# Patient Record
Sex: FEMALE | Race: WHITE | Employment: UNEMPLOYED | URBAN - METROPOLITAN AREA
[De-identification: names, ages, dates, MRNs, and addresses within clinical notes are randomized per-mention and may not be internally consistent; named-entity substitution may affect disease eponyms.]

---

## 2020-03-09 ENCOUNTER — OFFICE VISIT (OUTPATIENT)
Dept: PRIMARY CARE CLINIC | Age: 25
End: 2020-03-09
Payer: COMMERCIAL

## 2020-03-09 ENCOUNTER — HOSPITAL ENCOUNTER (OUTPATIENT)
Age: 25
Discharge: HOME | End: 2020-03-11
Payer: COMMERCIAL

## 2020-03-09 VITALS
OXYGEN SATURATION: 98 % | DIASTOLIC BLOOD PRESSURE: 80 MMHG | HEART RATE: 112 BPM | TEMPERATURE: 97.2 F | WEIGHT: 247 LBS | BODY MASS INDEX: 48.24 KG/M2 | SYSTOLIC BLOOD PRESSURE: 128 MMHG

## 2020-03-09 LAB
C-REACTIVE PROTEIN: 0.4 MG/DL (ref 0–0.4)
RHEUMATOID FACTOR: <10 IU/ML (ref 0–13)
SEDIMENTATION RATE, ERYTHROCYTE: 18 MM/HR (ref 0–20)

## 2020-03-09 PROCEDURE — 86140 C-REACTIVE PROTEIN: CPT

## 2020-03-09 PROCEDURE — 1036F TOBACCO NON-USER: CPT | Performed by: FAMILY MEDICINE

## 2020-03-09 PROCEDURE — 86431 RHEUMATOID FACTOR QUANT: CPT

## 2020-03-09 PROCEDURE — 99214 OFFICE O/P EST MOD 30 MIN: CPT | Performed by: FAMILY MEDICINE

## 2020-03-09 PROCEDURE — G8427 DOCREV CUR MEDS BY ELIG CLIN: HCPCS | Performed by: FAMILY MEDICINE

## 2020-03-09 PROCEDURE — 86038 ANTINUCLEAR ANTIBODIES: CPT

## 2020-03-09 PROCEDURE — 85651 RBC SED RATE NONAUTOMATED: CPT

## 2020-03-09 PROCEDURE — G8417 CALC BMI ABV UP PARAM F/U: HCPCS | Performed by: FAMILY MEDICINE

## 2020-03-09 PROCEDURE — 86039 ANTINUCLEAR ANTIBODIES (ANA): CPT

## 2020-03-09 PROCEDURE — G8484 FLU IMMUNIZE NO ADMIN: HCPCS | Performed by: FAMILY MEDICINE

## 2020-03-09 RX ORDER — METHYLPREDNISOLONE 4 MG/1
TABLET ORAL
COMMUNITY
Start: 2020-02-28 | End: 2021-08-05 | Stop reason: ALTCHOICE

## 2020-03-09 RX ORDER — TOPIRAMATE 25 MG/1
TABLET ORAL
Qty: 70 TABLET | Refills: 0 | Status: SHIPPED
Start: 2020-03-09 | End: 2020-03-09

## 2020-03-09 ASSESSMENT — ENCOUNTER SYMPTOMS
VOMITING: 0
SHORTNESS OF BREATH: 0
ABDOMINAL PAIN: 0
RHINORRHEA: 0
WHEEZING: 0
PHOTOPHOBIA: 1
CHEST TIGHTNESS: 0
DIARRHEA: 1
BLURRED VISION: 0
CONSTIPATION: 0
NAUSEA: 0

## 2020-03-09 ASSESSMENT — PATIENT HEALTH QUESTIONNAIRE - PHQ9
SUM OF ALL RESPONSES TO PHQ9 QUESTIONS 1 & 2: 0
1. LITTLE INTEREST OR PLEASURE IN DOING THINGS: 0
SUM OF ALL RESPONSES TO PHQ QUESTIONS 1-9: 0
SUM OF ALL RESPONSES TO PHQ QUESTIONS 1-9: 0
2. FEELING DOWN, DEPRESSED OR HOPELESS: 0

## 2020-03-10 LAB
ANA PATTERN: ABNORMAL
ANA TITER: ABNORMAL
ANTI-NUCLEAR ANTIBODY (ANA): POSITIVE

## 2020-03-11 ENCOUNTER — TELEPHONE (OUTPATIENT)
Dept: PRIMARY CARE CLINIC | Age: 25
End: 2020-03-11

## 2020-05-18 ENCOUNTER — VIRTUAL VISIT (OUTPATIENT)
Dept: PRIMARY CARE CLINIC | Age: 25
End: 2020-05-18
Payer: COMMERCIAL

## 2020-05-18 PROCEDURE — G8417 CALC BMI ABV UP PARAM F/U: HCPCS | Performed by: FAMILY MEDICINE

## 2020-05-18 PROCEDURE — G8427 DOCREV CUR MEDS BY ELIG CLIN: HCPCS | Performed by: FAMILY MEDICINE

## 2020-05-18 PROCEDURE — 99213 OFFICE O/P EST LOW 20 MIN: CPT | Performed by: FAMILY MEDICINE

## 2020-05-18 PROCEDURE — 1036F TOBACCO NON-USER: CPT | Performed by: FAMILY MEDICINE

## 2020-05-18 ASSESSMENT — ENCOUNTER SYMPTOMS
NAUSEA: 1
VOMITING: 1
ABDOMINAL PAIN: 0

## 2020-12-10 ENCOUNTER — HOSPITAL ENCOUNTER (OUTPATIENT)
Age: 25
Setting detail: OBSERVATION
Discharge: HOME | End: 2020-12-10
Attending: SPECIALIST | Admitting: OBSTETRICS & GYNECOLOGY
Payer: COMMERCIAL

## 2020-12-10 VITALS
BODY MASS INDEX: 48.1 KG/M2 | RESPIRATION RATE: 16 BRPM | TEMPERATURE: 98.4 F | HEART RATE: 111 BPM | DIASTOLIC BLOOD PRESSURE: 65 MMHG | SYSTOLIC BLOOD PRESSURE: 124 MMHG | HEIGHT: 60 IN | WEIGHT: 245 LBS

## 2020-12-10 PROBLEM — Z3A.36 36 WEEKS GESTATION OF PREGNANCY: Status: ACTIVE | Noted: 2020-12-10

## 2020-12-10 PROBLEM — Z64.1 MULTIPARITY: Status: ACTIVE | Noted: 2020-12-10

## 2020-12-10 LAB
BACTERIA: NORMAL /HPF
BILIRUBIN URINE: NEGATIVE
BLOOD, URINE: NORMAL
CLARITY: CLEAR
COLOR: YELLOW
EPITHELIAL CELLS, UA: NORMAL /HPF
GLUCOSE URINE: NEGATIVE MG/DL
KETONES, URINE: NEGATIVE MG/DL
LEUKOCYTE ESTERASE, URINE: NEGATIVE
NITRITE, URINE: NEGATIVE
PH UA: 6 (ref 5–9)
PROTEIN UA: NEGATIVE MG/DL
RBC UA: NORMAL /HPF (ref 0–2)
SPECIFIC GRAVITY UA: 1.01 (ref 1–1.03)
UROBILINOGEN, URINE: 0.2 E.U./DL
WBC UA: NORMAL /HPF (ref 0–5)

## 2020-12-10 PROCEDURE — 81001 URINALYSIS AUTO W/SCOPE: CPT

## 2020-12-10 PROCEDURE — G0378 HOSPITAL OBSERVATION PER HR: HCPCS

## 2020-12-10 PROCEDURE — 96365 THER/PROPH/DIAG IV INF INIT: CPT

## 2020-12-10 PROCEDURE — 96366 THER/PROPH/DIAG IV INF ADDON: CPT

## 2020-12-10 PROCEDURE — 6360000002 HC RX W HCPCS: Performed by: OBSTETRICS & GYNECOLOGY

## 2020-12-10 PROCEDURE — 99211 OFF/OP EST MAY X REQ PHY/QHP: CPT

## 2020-12-10 RX ORDER — MAGNESIUM SULFATE 1 G/100ML
1 INJECTION INTRAVENOUS ONCE
Status: COMPLETED | OUTPATIENT
Start: 2020-12-10 | End: 2020-12-10

## 2020-12-10 RX ORDER — LEVOTHYROXINE SODIUM 0.1 MG/1
50 TABLET ORAL DAILY
COMMUNITY
End: 2021-08-16 | Stop reason: DRUGHIGH

## 2020-12-10 RX ORDER — HYDROCODONE BITARTRATE AND ACETAMINOPHEN 5; 325 MG/1; MG/1
1 TABLET ORAL ONCE
Status: DISCONTINUED | OUTPATIENT
Start: 2020-12-10 | End: 2020-12-11 | Stop reason: HOSPADM

## 2020-12-10 RX ORDER — MULTIVITAMIN WITH IRON
100 TABLET ORAL DAILY
Status: ON HOLD | COMMUNITY
End: 2021-01-01 | Stop reason: HOSPADM

## 2020-12-10 RX ADMIN — MAGNESIUM SULFATE HEPTAHYDRATE 1 G: 1 INJECTION, SOLUTION INTRAVENOUS at 18:24

## 2020-12-10 RX ADMIN — MAGNESIUM SULFATE HEPTAHYDRATE 1 G: 1 INJECTION, SOLUTION INTRAVENOUS at 21:00

## 2020-12-10 NOTE — H&P
Department of Obstetrics and Gynecology  Attending Obstetrics History and Physical      HISTORY OF PRESENT ILLNESS:      The patient is a 30 y.o.  2 parity 1 at 36 weeks 1 days. Complaining of headache for 3 days. Tylenol didn't help.has history of migraines but does not feel like this. No motor or sensory deficits. Estimated Due Date:  21  Contractions:  no  Leaking of fluid: no  nfm  Blleeding:  No    PRENATAL CARE:    Complications: No      Active Problems:    * No active hospital problems. *  Resolved Problems:    * No resolved hospital problems. *        PAST OB HISTORY    OB History    Para Term  AB Living   2 1 1     1   SAB TAB Ectopic Molar Multiple Live Births             1      # Outcome Date GA Lbr [place]/2nd Weight Sex Delivery Anes PTL Lv   2 Current            1 Term 18    M Vag-Spont  N BERNARD           Pre-eclampsia:  No      :  No      D & C:  No      Cerclage:  No      LEEP:  No      Myomectomy:  No       Labor: No    Past Medical History:    Past Medical History:   Diagnosis Date    Diabetes mellitus ([facility] [address]     prev pregnancy    Thyroid disease     elevated        Past Surgical History:    History reviewed. No pertinent surgical history. Past Family History:  History reviewed. No pertinent family history. ROS:  Const: No fever, chills, night sweats, no recent unexplained weight gain/loss  HEENT: No blurred vision, double vision; no ear problems; no sore throat, congestion; no running nose. Resp: No cough, no sputum, no pleuritic chest pain, no sob  Cardio: No chest pain, no exertional dyspnea, no PND, no orthopnea, no palpitation, no leg swelling. GI: No dysphagia, no reflux; no abdominal pain, no n/v; no c/d.  No hematochezia    : No dysuria, no frequency, hesitancy; no hematuria  MSK: no joint pain, no myalgia, no change in ROM  Neuro: no focal weakness in extremities, no slurred speech, no double vision, no numbness or tingling in extremities  Endo: no heat/cold intolerance, no polyphagia, polydipsia or polyuria  Hem: no increased bleeding, no bruising, no lymphadenopathy  Skin: no skin changes  Psych: no depressed mood, no suicidal ideation    Social History:     reports that she has never smoked. She has never used smokeless tobacco. She reports previous alcohol use. She reports that she does not use drugs. Medications Prior to Admission:  Medications Prior to Admission: Prenatal MV-Min-Fe Fum-FA-DHA (PRENATAL 1 PO), Take 1 tablet by mouth  levothyroxine (SYNTHROID) 100 MCG tablet, Take 50 mcg by mouth Daily  Doxylamine Succinate, Sleep, (UNISOM PO), Take 1 tablet by mouth  vitamin B-6 (PYRIDOXINE) 100 MG tablet, Take 100 mg by mouth daily  methylPREDNISolone (MEDROL) 4 MG tablet,   topiramate (TOPAMAX) 25 MG tablet, TAKE 1 TABLET BY MOUTH NIGHTLY FOR 7 DAYS, TAKE TAKE 1 TABLET TWICE DAILY FOR 7 DAYS, THEN TAKE 2 TABLETS TWICE DAILY FOR 14 DAYS (Patient not taking: Reported on 5/18/2020)    Allergies:  Patient has no known allergies. PHYSICAL EXAM:  /65   Pulse 111   Temp 98.4 °F (36.9 °C) (Axillary)   Resp 16   Ht 5' (1.524 m)   Wt 245 lb (111.1 kg)   BMI 47.85 kg/m²   General appearance: Comfortable  Lungs:  CTA   Heart:  Regular Rhythm  Abdomen:  Soft, non-tender, gravid  Fetal heart rate:   reactive  Contraction frequency:   none  Membranes:  Intact  Neuro intact    ASSESSMENT     IUP at 36 weeks.     headache         Plan: dr Jeff Aguilera was notified by the nurses and orders recieved          Electronically signed by Earline Page MD on 12/10/2020 at 6:06 PM

## 2020-12-30 ENCOUNTER — HOSPITAL ENCOUNTER (INPATIENT)
Age: 25
LOS: 3 days | Discharge: HOME | End: 2021-01-02
Attending: SPECIALIST | Admitting: OBSTETRICS & GYNECOLOGY
Payer: COMMERCIAL

## 2020-12-30 ENCOUNTER — ANESTHESIA EVENT (OUTPATIENT)
Dept: LABOR AND DELIVERY | Age: 25
End: 2020-12-30
Payer: COMMERCIAL

## 2020-12-30 ENCOUNTER — ANESTHESIA (OUTPATIENT)
Dept: LABOR AND DELIVERY | Age: 25
End: 2020-12-30
Payer: COMMERCIAL

## 2020-12-30 ENCOUNTER — APPOINTMENT (OUTPATIENT)
Dept: LABOR AND DELIVERY | Age: 25
End: 2020-12-30
Payer: COMMERCIAL

## 2020-12-30 PROBLEM — Z34.90 NORMAL PREGNANCY, UNSPECIFIED TRIMESTER: Status: ACTIVE | Noted: 2020-12-30

## 2020-12-30 LAB
HCT VFR BLD CALC: 37.2 % (ref 34–48)
HEMOGLOBIN: 12.3 G/DL (ref 11.5–15.5)
MCH RBC QN AUTO: 29.3 PG (ref 26–35)
MCHC RBC AUTO-ENTMCNC: 33.1 % (ref 32–34.5)
MCV RBC AUTO: 88.6 FL (ref 80–99.9)
PDW BLD-RTO: 15.4 FL (ref 11.5–15)
PLATELET # BLD: 211 E9/L (ref 130–450)
PMV BLD AUTO: 11.7 FL (ref 7–12)
RBC # BLD: 4.2 E12/L (ref 3.5–5.5)
WBC # BLD: 11.3 E9/L (ref 4.5–11.5)

## 2020-12-30 PROCEDURE — 2580000003 HC RX 258: Performed by: OBSTETRICS & GYNECOLOGY

## 2020-12-30 PROCEDURE — 80307 DRUG TEST PRSMV CHEM ANLYZR: CPT

## 2020-12-30 PROCEDURE — 85027 COMPLETE CBC AUTOMATED: CPT

## 2020-12-30 PROCEDURE — 6360000002 HC RX W HCPCS: Performed by: OBSTETRICS & GYNECOLOGY

## 2020-12-30 PROCEDURE — 86850 RBC ANTIBODY SCREEN: CPT

## 2020-12-30 PROCEDURE — 86901 BLOOD TYPING SEROLOGIC RH(D): CPT

## 2020-12-30 PROCEDURE — 36415 COLL VENOUS BLD VENIPUNCTURE: CPT

## 2020-12-30 PROCEDURE — 86900 BLOOD TYPING SEROLOGIC ABO: CPT

## 2020-12-30 PROCEDURE — 1220000001 HC SEMI PRIVATE L&D R&B

## 2020-12-30 RX ORDER — SODIUM CHLORIDE 0.9 % (FLUSH) 0.9 %
10 SYRINGE (ML) INJECTION EVERY 12 HOURS SCHEDULED
Status: DISCONTINUED | OUTPATIENT
Start: 2020-12-30 | End: 2020-12-31 | Stop reason: SDUPTHER

## 2020-12-30 RX ORDER — DOCUSATE SODIUM 100 MG/1
100 CAPSULE, LIQUID FILLED ORAL 2 TIMES DAILY
Status: DISCONTINUED | OUTPATIENT
Start: 2020-12-30 | End: 2020-12-31 | Stop reason: SDUPTHER

## 2020-12-30 RX ORDER — ONDANSETRON 2 MG/ML
4 INJECTION INTRAMUSCULAR; INTRAVENOUS EVERY 6 HOURS PRN
Status: DISCONTINUED | OUTPATIENT
Start: 2020-12-30 | End: 2021-01-02 | Stop reason: HOSPADM

## 2020-12-30 RX ORDER — SODIUM CHLORIDE, SODIUM LACTATE, POTASSIUM CHLORIDE, CALCIUM CHLORIDE 600; 310; 30; 20 MG/100ML; MG/100ML; MG/100ML; MG/100ML
INJECTION, SOLUTION INTRAVENOUS CONTINUOUS
Status: DISCONTINUED | OUTPATIENT
Start: 2020-12-30 | End: 2021-01-02 | Stop reason: HOSPADM

## 2020-12-30 RX ORDER — PENICILLIN G 3000000 [IU]/50ML
3 INJECTION, SOLUTION INTRAVENOUS EVERY 4 HOURS
Status: DISCONTINUED | OUTPATIENT
Start: 2020-12-31 | End: 2021-01-02 | Stop reason: HOSPADM

## 2020-12-30 RX ORDER — SODIUM CHLORIDE 0.9 % (FLUSH) 0.9 %
10 SYRINGE (ML) INJECTION PRN
Status: DISCONTINUED | OUTPATIENT
Start: 2020-12-30 | End: 2021-01-02 | Stop reason: HOSPADM

## 2020-12-30 RX ADMIN — SODIUM CHLORIDE, POTASSIUM CHLORIDE, SODIUM LACTATE AND CALCIUM CHLORIDE: 600; 310; 30; 20 INJECTION, SOLUTION INTRAVENOUS at 23:57

## 2020-12-30 RX ADMIN — PENICILLIN G POTASSIUM 5 MILLION UNITS: 5000000 INJECTION, POWDER, FOR SOLUTION INTRAMUSCULAR; INTRAVENOUS at 23:58

## 2020-12-30 ASSESSMENT — PAIN SCALES - GENERAL: PAINLEVEL_OUTOF10: 4

## 2020-12-31 LAB
ABO/RH: NORMAL
AMPHETAMINE SCREEN, URINE: NOT DETECTED
ANTIBODY SCREEN: NORMAL
BARBITURATE SCREEN URINE: NOT DETECTED
BENZODIAZEPINE SCREEN, URINE: NOT DETECTED
CANNABINOID SCREEN URINE: NOT DETECTED
COCAINE METABOLITE SCREEN URINE: NOT DETECTED
FENTANYL SCREEN, URINE: NOT DETECTED
Lab: NORMAL
METHADONE SCREEN, URINE: NOT DETECTED
OPIATE SCREEN URINE: NOT DETECTED
OXYCODONE URINE: NOT DETECTED
PHENCYCLIDINE SCREEN URINE: NOT DETECTED

## 2020-12-31 PROCEDURE — 7200000001 HC VAGINAL DELIVERY

## 2020-12-31 PROCEDURE — 0KQM0ZZ REPAIR PERINEUM MUSCLE, OPEN APPROACH: ICD-10-PCS | Performed by: OBSTETRICS & GYNECOLOGY

## 2020-12-31 PROCEDURE — 2580000003 HC RX 258: Performed by: OBSTETRICS & GYNECOLOGY

## 2020-12-31 PROCEDURE — 6370000000 HC RX 637 (ALT 250 FOR IP): Performed by: OBSTETRICS & GYNECOLOGY

## 2020-12-31 PROCEDURE — 3700000025 EPIDURAL BLOCK: Performed by: ANESTHESIOLOGY

## 2020-12-31 PROCEDURE — 2500000003 HC RX 250 WO HCPCS: Performed by: ANESTHESIOLOGY

## 2020-12-31 PROCEDURE — 3E033VJ INTRODUCTION OF OTHER HORMONE INTO PERIPHERAL VEIN, PERCUTANEOUS APPROACH: ICD-10-PCS | Performed by: OBSTETRICS & GYNECOLOGY

## 2020-12-31 PROCEDURE — 51701 INSERT BLADDER CATHETER: CPT

## 2020-12-31 PROCEDURE — 6360000002 HC RX W HCPCS: Performed by: OBSTETRICS & GYNECOLOGY

## 2020-12-31 PROCEDURE — 2500000003 HC RX 250 WO HCPCS: Performed by: NURSE ANESTHETIST, CERTIFIED REGISTERED

## 2020-12-31 PROCEDURE — 1220000000 HC SEMI PRIVATE OB R&B

## 2020-12-31 RX ORDER — ONDANSETRON 2 MG/ML
4 INJECTION INTRAMUSCULAR; INTRAVENOUS EVERY 6 HOURS PRN
Status: DISCONTINUED | OUTPATIENT
Start: 2020-12-31 | End: 2020-12-31 | Stop reason: SDUPTHER

## 2020-12-31 RX ORDER — LIDOCAINE HYDROCHLORIDE 10 MG/ML
INJECTION, SOLUTION INFILTRATION; PERINEURAL
Status: COMPLETED
Start: 2020-12-31 | End: 2020-12-31

## 2020-12-31 RX ORDER — DOCUSATE SODIUM 100 MG/1
100 CAPSULE, LIQUID FILLED ORAL 2 TIMES DAILY
Status: DISCONTINUED | OUTPATIENT
Start: 2020-12-31 | End: 2021-01-02 | Stop reason: HOSPADM

## 2020-12-31 RX ORDER — SODIUM CHLORIDE, SODIUM LACTATE, POTASSIUM CHLORIDE, CALCIUM CHLORIDE 600; 310; 30; 20 MG/100ML; MG/100ML; MG/100ML; MG/100ML
INJECTION, SOLUTION INTRAVENOUS CONTINUOUS
Status: DISCONTINUED | OUTPATIENT
Start: 2020-12-31 | End: 2021-01-02 | Stop reason: HOSPADM

## 2020-12-31 RX ORDER — SODIUM CHLORIDE 0.9 % (FLUSH) 0.9 %
10 SYRINGE (ML) INJECTION PRN
Status: DISCONTINUED | OUTPATIENT
Start: 2020-12-31 | End: 2021-01-02 | Stop reason: HOSPADM

## 2020-12-31 RX ORDER — IBUPROFEN 800 MG/1
800 TABLET ORAL EVERY 8 HOURS
Status: DISCONTINUED | OUTPATIENT
Start: 2020-12-31 | End: 2021-01-02 | Stop reason: HOSPADM

## 2020-12-31 RX ORDER — MODIFIED LANOLIN
OINTMENT (GRAM) TOPICAL PRN
Status: DISCONTINUED | OUTPATIENT
Start: 2020-12-31 | End: 2021-01-02 | Stop reason: HOSPADM

## 2020-12-31 RX ORDER — SODIUM CHLORIDE 0.9 % (FLUSH) 0.9 %
10 SYRINGE (ML) INJECTION EVERY 12 HOURS SCHEDULED
Status: DISCONTINUED | OUTPATIENT
Start: 2020-12-31 | End: 2021-01-02 | Stop reason: HOSPADM

## 2020-12-31 RX ORDER — ACETAMINOPHEN 325 MG/1
650 TABLET ORAL EVERY 4 HOURS PRN
Status: DISCONTINUED | OUTPATIENT
Start: 2020-12-31 | End: 2021-01-02 | Stop reason: HOSPADM

## 2020-12-31 RX ORDER — LEVOTHYROXINE SODIUM 0.05 MG/1
50 TABLET ORAL DAILY
Status: DISCONTINUED | OUTPATIENT
Start: 2020-12-31 | End: 2021-01-02 | Stop reason: HOSPADM

## 2020-12-31 RX ORDER — NALOXONE HYDROCHLORIDE 0.4 MG/ML
0.4 INJECTION, SOLUTION INTRAMUSCULAR; INTRAVENOUS; SUBCUTANEOUS PRN
Status: DISCONTINUED | OUTPATIENT
Start: 2020-12-31 | End: 2020-12-31 | Stop reason: HOSPADM

## 2020-12-31 RX ORDER — ACETAMINOPHEN 650 MG
TABLET, EXTENDED RELEASE ORAL
Status: COMPLETED
Start: 2020-12-31 | End: 2020-12-31

## 2020-12-31 RX ORDER — NALBUPHINE HCL 10 MG/ML
5 AMPUL (ML) INJECTION EVERY 4 HOURS PRN
Status: DISCONTINUED | OUTPATIENT
Start: 2020-12-31 | End: 2020-12-31 | Stop reason: HOSPADM

## 2020-12-31 RX ADMIN — Medication 1 MILLI-UNITS/MIN: at 01:04

## 2020-12-31 RX ADMIN — SODIUM CHLORIDE, PRESERVATIVE FREE 10 ML: 5 INJECTION INTRAVENOUS at 20:39

## 2020-12-31 RX ADMIN — Medication 87.3 MILLI-UNITS/MIN: at 05:31

## 2020-12-31 RX ADMIN — PENICILLIN G 3 MILLION UNITS: 3000000 INJECTION, SOLUTION INTRAVENOUS at 04:45

## 2020-12-31 RX ADMIN — Medication 166.7 ML: at 15:15

## 2020-12-31 RX ADMIN — DOCUSATE SODIUM 100 MG: 100 CAPSULE ORAL at 20:38

## 2020-12-31 RX ADMIN — BUTORPHANOL TARTRATE 2 MG: 2 INJECTION, SOLUTION INTRAMUSCULAR; INTRAVENOUS at 04:45

## 2020-12-31 RX ADMIN — PENICILLIN G 3 MILLION UNITS: 3000000 INJECTION, SOLUTION INTRAVENOUS at 08:53

## 2020-12-31 RX ADMIN — Medication 5 ML: at 09:31

## 2020-12-31 RX ADMIN — SODIUM CHLORIDE, POTASSIUM CHLORIDE, SODIUM LACTATE AND CALCIUM CHLORIDE: 600; 310; 30; 20 INJECTION, SOLUTION INTRAVENOUS at 10:20

## 2020-12-31 RX ADMIN — PENICILLIN G 3 MILLION UNITS: 3000000 INJECTION, SOLUTION INTRAVENOUS at 12:52

## 2020-12-31 RX ADMIN — Medication 5 ML: at 09:35

## 2020-12-31 RX ADMIN — IBUPROFEN 800 MG: 800 TABLET, FILM COATED ORAL at 17:24

## 2020-12-31 RX ADMIN — ONDANSETRON 4 MG: 2 INJECTION INTRAMUSCULAR; INTRAVENOUS at 04:51

## 2020-12-31 RX ADMIN — SODIUM CHLORIDE, POTASSIUM CHLORIDE, SODIUM LACTATE AND CALCIUM CHLORIDE: 600; 310; 30; 20 INJECTION, SOLUTION INTRAVENOUS at 06:27

## 2020-12-31 RX ADMIN — Medication 15 ML/HR: at 09:31

## 2020-12-31 RX ADMIN — Medication: at 15:00

## 2020-12-31 NOTE — PROGRESS NOTES
Dr Hewitt Blinks updated on pts status. Informed of cervical exam.  Would like AROM and internal monitors placed if possible.

## 2020-12-31 NOTE — PROGRESS NOTES
Notified Dr Vero Lombardo of patient becoming uncomfortable, rating contractions 8/10, requesting something for pain but not ready for an epidural at this time. Stadol orders received and patient can have epidural when ready.

## 2020-12-31 NOTE — PROGRESS NOTES
Updated Dr Wilbert Henderson on patient SVE 1-2/60/-3. GBS +, Occasional contraction, tolerable for patient at this time. Orders for pitocin induction and pcn. States to call her when patient needs something for pain.

## 2020-12-31 NOTE — ANESTHESIA PRE PROCEDURE
Department of Anesthesiology  Preprocedure Note       Name:  Lewis Freitas   Age:  30 y.o.  :  1995                                          MRN:  [id number]         Date:  2020      Surgeon: * No surgeons listed *    Procedure: * No procedures listed *    Medications prior to admission:   Prior to Admission medications    Medication Sig Start Date End Date Taking?  Authorizing Provider   Prenatal MV-Min-Fe Fum-FA-DHA (PRENATAL 1 PO) Take 1 tablet by mouth   Yes Historical Provider, MD   levothyroxine (SYNTHROID) 100 MCG tablet Take 50 mcg by mouth Daily   Yes Historical Provider, MD   Doxylamine Succinate, Sleep, (UNISOM PO) Take 1 tablet by mouth   Yes Historical Provider, MD   vitamin B-6 (PYRIDOXINE) 100 MG tablet Take 100 mg by mouth daily   Yes Historical Provider, MD   methylPREDNISolone (MEDROL) 4 MG tablet  20   Historical Provider, MD   topiramate (TOPAMAX) 25 MG tablet TAKE 1 TABLET BY MOUTH NIGHTLY FOR 7 DAYS, TAKE TAKE 1 TABLET TWICE DAILY FOR 7 DAYS, THEN TAKE 2 TABLETS TWICE DAILY FOR 14 DAYS  Patient not taking: Reported on 2020 3/9/20   Fede Acevedo DO       Current medications:    Current Facility-Administered Medications   Medication Dose Route Frequency Provider Last Rate Last Admin    lactated ringers infusion   Intravenous Continuous Jose E Acuña DO        sodium chloride flush 0.9 % injection 10 mL  10 mL Intravenous 2 times per day Jose E Acuña DO        sodium chloride flush 0.9 % injection 10 mL  10 mL Intravenous PRN Jose E Acuña DO        oxytocin (PITOCIN) 10 unit bolus from the bag  10 Units Intravenous PRN Jose E Acuña DO        And    oxytocin (PITOCIN) 30 units in 500 mL infusion  87.3 suzette-units/min Intravenous PRN Jose E Acuña DO        ondansetron (ZOFRAN) injection 4 mg  4 mg Intravenous Q6H PRN Jose E Acuña DO        docusate sodium (COLACE) capsule 100 mg  100 mg Oral BID Екат[address], DO  penicillin G potassium 5 Million Units in dextrose 5 % 100 mL IVPB (mini-bag)  5 Million Units Intravenous Once Pebbles He DO        [START ON 12/31/2020] penicillin G potassium IVPB 3 Million Units  3 Million Units Intravenous Q4H Jose E Acuña DO           Allergies:  No Known Allergies    Problem List:    Patient Active Problem List   Diagnosis Code    Hirsutism L68.0    36 weeks gestation of pregnancy Z3A.36    Multiparity Z64.1    Normal pregnancy, unspecified trimester Z34.90       Past Medical History:        Diagnosis Date    Diabetes mellitus ([address]     prev pregnancy    Thyroid disease     elevated       Past Surgical History:  History reviewed. No pertinent surgical history. Social History:    Social History     Tobacco Use    Smoking status: Never Smoker    Smokeless tobacco: Never Used   Substance Use Topics    Alcohol use: Not Currently                                Counseling given: Not Answered      Vital Signs (Current):   Vitals:    12/30/20 2315   BP: 123/72   Pulse: 123   Resp: 18   Temp: 37.1 °C (98.7 °F)   TempSrc: Oral   Weight: 265 lb (120.2 kg)   Height: 5' (1.524 m)                                              BP Readings from Last 3 Encounters:   12/30/20 123/72   12/10/20 124/65   03/09/20 128/80       NPO Status:                                                                                 BMI:   Wt Readings from Last 3 Encounters:   12/30/20 265 lb (120.2 kg)   12/10/20 245 lb (111.1 kg)   03/09/20 247 lb (112 kg)     Body mass index is 51.75 kg/m².     CBC:   Lab Results   Component Value Date    WBC 8.4 05/04/2017    RBC 4.57 05/04/2017    HGB 13.4 05/04/2017    HCT 40.9 05/04/2017    MCV 89.5 05/04/2017    RDW 14.0 05/04/2017     05/04/2017       CMP:   Lab Results   Component Value Date     05/04/2017    K 4.3 05/04/2017    CL 98 05/04/2017    CO2 20 05/04/2017    BUN 8 05/04/2017    CREATININE 0.6 05/04/2017    GFRAA >60 05/04/2017 LABGLOM >60 05/04/2017    GLUCOSE 81 05/04/2017    PROT 7.6 05/04/2017    CALCIUM 9.1 05/04/2017    BILITOT 0.3 05/04/2017    ALKPHOS 79 05/04/2017    AST 33 05/04/2017    ALT 46 05/04/2017       POC Tests: No results for input(s): POCGLU, POCNA, POCK, POCCL, POCBUN, POCHEMO, POCHCT in the last 72 hours. Coags: No results found for: PROTIME, INR, APTT    HCG (If Applicable): No results found for: PREGTESTUR, PREGSERUM, HCG, HCGQUANT     ABGs: No results found for: PHART, PO2ART, RTU6UYL, USJ8GHB, BEART, Y1UCUXKE     Type & Screen (If Applicable):  No results found for: LABABO, LABRH    Drug/Infectious Status (If Applicable):  No results found for: HIV, HEPCAB    COVID-19 Screening (If Applicable): No results found for: COVID19      Anesthesia Evaluation  Patient summary reviewed and Nursing notes reviewed no history of anesthetic complications (denies):   Airway: Mallampati: III  TM distance: >3 FB   Neck ROM: full  Comment: Right nare piercing advised to removed  Mouth opening: > = 3 FB Dental:          Pulmonary: breath sounds clear to auscultation            Patient did not smoke on day of surgery. ROS comment: No covid testing no symptoms   Cardiovascular:Negative CV ROS            Rhythm: regular  Rate: normal                    Neuro/Psych:   (+) headaches (was on topamax/ on magnesium currently): migraine headaches,              ROS comment: Sciatica bilateral GI/Hepatic/Renal:   (+) GERD (otc prn): well controlled, morbid obesity         ROS comment: [place] teeth extraction  Vaginal delivery 2018. Endo/Other:    (+) Diabetes (previous pregnancy gestational ), hypothyroidism::., .                 Abdominal:   (+) obese,         Vascular:                                      Anesthesia Plan      general, spinal and epidural     ASA 2     (Risks and benefits discussed agree to proceed/ pt npo 2200)        Anesthetic plan and risks discussed with patient and spouse. Use of blood products discussed with patient whom consented to blood products. Plan discussed with attending.                 TERENCE Hutchison - ALEENA   12/30/2020

## 2020-12-31 NOTE — H&P
CHIEF COMPLAINT: here for induction     HISTORY OF PRESENT ILLNESS:      The patient is a 30 y.o. female at 36w3d. OB History        2    Para   1    Term   1            AB        Living   1       SAB        TAB        Ectopic        Molar        Multiple        Live Births   1            Patient presents with a chief complaint as above and is being admitted for induction    Estimated Due Date: Estimated Date of Delivery: 21    PRENATAL CARE:    Complicated by: none    PAST OB HISTORY  OB History        2    Para   1    Term   1            AB        Living   1       SAB        TAB        Ectopic        Molar        Multiple        Live Births   1                Past Medical History:        Diagnosis Date    Diabetes mellitus ([facility] [address]     prev pregnancy    Thyroid disease     elevated     Past Surgical History:    History reviewed. No pertinent surgical history. Allergies:  Patient has no known allergies.   Social History:    Social History     Socioeconomic History    Marital status:      Spouse name: Not on file    Number of children: Not on file    Years of education: Not on file    Highest education level: Not on file   Occupational History    Not on file   Social Needs    Financial resource strain: Not on file    Food insecurity     Worry: Not on file     Inability: Not on file   [employer] needs     Medical: Not on file     Non-medical: Not on file   Tobacco Use    Smoking status: Never Smoker    Smokeless tobacco: Never Used   Substance and Sexual Activity    Alcohol use: Not Currently    Drug use: Never    Sexual activity: Yes     Birth control/protection: Pill   Lifestyle    Physical activity     Days per week: Not on file     Minutes per session: Not on file    Stress: Not on file   Relationships    Social connections     Talks on phone: Not on file     Gets together: Not on file     Attends Yarsani service: Not on file     Active member of club or organization: Not on file     Attends meetings of clubs or organizations: Not on file     Relationship status: Not on file    Intimate partner violence     Fear of current or ex partner: Not on file     Emotionally abused: Not on file     Physically abused: Not on file     Forced sexual activity: Not on file   Other Topics Concern    Not on file   Social History Narrative    Not on file     Family History:   History reviewed. No pertinent family history. Medications Prior to Admission:  Medications Prior to Admission: Prenatal MV-Min-Fe Fum-FA-DHA (PRENATAL 1 PO), Take 1 tablet by mouth  levothyroxine (SYNTHROID) 100 MCG tablet, Take 50 mcg by mouth Daily  Doxylamine Succinate, Sleep, (UNISOM PO), Take 1 tablet by mouth  vitamin B-6 (PYRIDOXINE) 100 MG tablet, Take 100 mg by mouth daily  methylPREDNISolone (MEDROL) 4 MG tablet,   topiramate (TOPAMAX) 25 MG tablet, TAKE 1 TABLET BY MOUTH NIGHTLY FOR 7 DAYS, TAKE TAKE 1 TABLET TWICE DAILY FOR 7 DAYS, THEN TAKE 2 TABLETS TWICE DAILY FOR 14 DAYS (Patient not taking: Reported on 5/18/2020)    REVIEW OF SYSTEMS:    CONSTITUTIONAL:  negative  RESPIRATORY:  negative  CARDIOVASCULAR:  negative  GASTROINTESTINAL:  negative  ALLERGIC/IMMUNOLOGIC:  negative  NEUROLOGICAL:  negative  BEHAVIOR/PSYCH:  negative    PHYSICAL EXAM:  Vitals:    12/31/20 0309 12/31/20 0339 12/31/20 0509 12/31/20 0539   BP: 128/75 125/69 114/63 118/69   Pulse: 103 102 86 89   Resp:       Temp:       TempSrc:       Weight:       Height:         General appearance:  awake, alert, cooperative, no apparent distress, and appears stated age  Neurologic:  Awake, alert, oriented to name, place and time. Lungs:  No increased work of breathing, good air exchange  Abdomen:  Soft, non tender, gravid, consistent with her gestational age, EFW by Leopald's manouever was cephalic   Fetal heart rate:  Reassuring.   Pelvis:  Adequate pelvis  Cervix: 2 cm 50% soft -3  Contraction frequency:  2 minutes    Membranes:  Intact    ASSESSMENT AND PLAN:    Labor: Admit,  routine labor orders  Fetus: Reassuring  GBS: Yes  Other: candida,    Dr.J. Domenico SAAVEDRA

## 2020-12-31 NOTE — PROGRESS NOTES
Updated Dr Brendan Page on patient SVE 2/70/-2. Patient karla q2-4, on 12 of pitocin. Able to sleep since receiving stadol but is waking up and stating she is very uncomfortable again rating contractions 8/10. If patient is not wanting epidural it is okay to give one more dose of 2mg stadol.

## 2020-12-31 NOTE — PROGRESS NOTES
39 week presents for scheduled IOL. Denies any VB, LOF, states +FM. States she sees Dr. Arnulfo Carroll for thyroid issues during pregnancy. Denies any further complications.  Placed on EFM, call light in reach

## 2020-12-31 NOTE — ANESTHESIA PROCEDURE NOTES
Epidural Block    Patient location during procedure: OB  Start time: 12/31/2020 9:08 AM  End time: 12/31/2020 9:33 AM  Reason for block: labor epidural  Staffing  Performed: resident/CRNA   Anesthesiologist: Chen Lloyd MD  Resident/CRNA: TERENCE Wyman - CRNA  Preanesthetic Checklist  Completed: patient identified, IV checked, site marked, risks and benefits discussed, surgical consent, monitors and equipment checked, pre-op evaluation, timeout performed, anesthesia consent given, oxygen available and patient being monitored  Epidural  Patient position: sitting  Prep: ChloraPrep  Patient monitoring: continuous pulse ox and frequent blood pressure checks  Approach: midline  Location: lumbar (1-5)  Injection technique: VINCE air  Provider prep: mask and sterile gloves  Needle  Needle type: Tuohy   Needle gauge: 18 G  Needle length: 3.5 in  Needle insertion depth: 7 cm  Catheter type: end hole  Catheter size: 20 G.   Catheter at skin depth: 14 cm  Test dose: negative  Assessment  Hemodynamics: stable  Attempts: 1

## 2021-01-01 LAB
HCT VFR BLD CALC: 34.7 % (ref 34–48)
HEMOGLOBIN: 11.5 G/DL (ref 11.5–15.5)
MCH RBC QN AUTO: 29.5 PG (ref 26–35)
MCHC RBC AUTO-ENTMCNC: 33.1 % (ref 32–34.5)
MCV RBC AUTO: 89 FL (ref 80–99.9)
PDW BLD-RTO: 15.4 FL (ref 11.5–15)
PLATELET # BLD: 201 E9/L (ref 130–450)
PMV BLD AUTO: 11.3 FL (ref 7–12)
RBC # BLD: 3.9 E12/L (ref 3.5–5.5)
WBC # BLD: 16 E9/L (ref 4.5–11.5)

## 2021-01-01 PROCEDURE — 36415 COLL VENOUS BLD VENIPUNCTURE: CPT

## 2021-01-01 PROCEDURE — 1220000000 HC SEMI PRIVATE OB R&B

## 2021-01-01 PROCEDURE — 6370000000 HC RX 637 (ALT 250 FOR IP): Performed by: OBSTETRICS & GYNECOLOGY

## 2021-01-01 PROCEDURE — 85027 COMPLETE CBC AUTOMATED: CPT

## 2021-01-01 RX ORDER — IBUPROFEN 800 MG/1
800 TABLET ORAL EVERY 8 HOURS PRN
Qty: 120 TABLET | Refills: 3 | Status: SHIPPED | OUTPATIENT
Start: 2021-01-01 | End: 2022-07-01

## 2021-01-01 RX ADMIN — DOCUSATE SODIUM 100 MG: 100 CAPSULE ORAL at 20:42

## 2021-01-01 RX ADMIN — LEVOTHYROXINE SODIUM 50 MCG: 50 TABLET ORAL at 06:56

## 2021-01-01 RX ADMIN — BENZOCAINE AND LEVOMENTHOL: 200; 5 SPRAY TOPICAL at 04:18

## 2021-01-01 RX ADMIN — IBUPROFEN 800 MG: 800 TABLET, FILM COATED ORAL at 09:26

## 2021-01-01 RX ADMIN — ACETAMINOPHEN 650 MG: 325 TABLET ORAL at 20:45

## 2021-01-01 RX ADMIN — IBUPROFEN 800 MG: 800 TABLET, FILM COATED ORAL at 18:53

## 2021-01-01 RX ADMIN — DOCUSATE SODIUM 100 MG: 100 CAPSULE ORAL at 09:26

## 2021-01-01 RX ADMIN — Medication: at 18:53

## 2021-01-01 RX ADMIN — IBUPROFEN 800 MG: 800 TABLET, FILM COATED ORAL at 00:04

## 2021-01-01 ASSESSMENT — PAIN SCALES - GENERAL
PAINLEVEL_OUTOF10: 5
PAINLEVEL_OUTOF10: 1

## 2021-01-01 ASSESSMENT — PAIN DESCRIPTION - DESCRIPTORS: DESCRIPTORS: CRAMPING

## 2021-01-01 ASSESSMENT — PAIN DESCRIPTION - FREQUENCY: FREQUENCY: INTERMITTENT

## 2021-01-01 ASSESSMENT — PAIN DESCRIPTION - ONSET: ONSET: GRADUAL

## 2021-01-01 ASSESSMENT — PAIN - FUNCTIONAL ASSESSMENT: PAIN_FUNCTIONAL_ASSESSMENT: ACTIVITIES ARE NOT PREVENTED

## 2021-01-01 ASSESSMENT — PAIN DESCRIPTION - LOCATION: LOCATION: ABDOMEN

## 2021-01-01 NOTE — PROGRESS NOTES
Up to restroom with standby assist. Voided without difficulty. pericare done.  No complaints voiced at this time

## 2021-01-01 NOTE — DISCHARGE SUMMARY
Admitted  12/30/20  Pregnancy  39 weeks  Induction  Vaginal delivery 12/31/20  rx motrin  Follow up  6 weeks discharged to home in stable condition 1/2/21

## 2021-01-01 NOTE — LACTATION NOTE
Experienced breastfeeding mother. States baby is latching well for her. Encouraged to offer frequent feedings. Has EBP at home. Lactation contact & [employer] becca info given.

## 2021-01-01 NOTE — PROGRESS NOTES
[employer] [place] Hearing screening results were discussed with parent. Questions answered. Brochure given to parent. Advised to monitor developmental milestones and contact physician for any concerns.    Precilla Lani

## 2021-01-02 VITALS
DIASTOLIC BLOOD PRESSURE: 68 MMHG | WEIGHT: 265 LBS | HEIGHT: 60 IN | HEART RATE: 92 BPM | RESPIRATION RATE: 18 BRPM | TEMPERATURE: 98.8 F | SYSTOLIC BLOOD PRESSURE: 134 MMHG | BODY MASS INDEX: 52.03 KG/M2

## 2021-01-02 PROCEDURE — 6370000000 HC RX 637 (ALT 250 FOR IP): Performed by: OBSTETRICS & GYNECOLOGY

## 2021-01-02 RX ADMIN — IBUPROFEN 800 MG: 800 TABLET, FILM COATED ORAL at 13:29

## 2021-01-02 RX ADMIN — ACETAMINOPHEN 650 MG: 325 TABLET ORAL at 11:48

## 2021-01-02 RX ADMIN — IBUPROFEN 800 MG: 800 TABLET, FILM COATED ORAL at 04:59

## 2021-01-02 RX ADMIN — LEVOTHYROXINE SODIUM 50 MCG: 50 TABLET ORAL at 08:55

## 2021-01-02 RX ADMIN — DOCUSATE SODIUM 100 MG: 100 CAPSULE ORAL at 08:55

## 2021-01-02 ASSESSMENT — PAIN SCALES - GENERAL
PAINLEVEL_OUTOF10: 3
PAINLEVEL_OUTOF10: 3

## 2021-01-02 NOTE — PROGRESS NOTES
Pt states ready for discharge; discharge instructions given to pt w/ understanding verbalized; prescription for motrin handed to pt; pt denies any questions. Pt discharged in apparently stable condition to home w/ baby. +

## 2021-01-02 NOTE — LACTATION NOTE
Mom breast and formula feeding, normal milk production reviewed. Encouraged frequent feeds to establish milk supply. Reviewed benefits and safety of skin to skin. Inst on adequate I/O and importance of keeping track of diapers at home. Instructed on signs of dehydration such as infant refusing to feed, decreased wet diapers and infant becoming listless and notify provider if these occur. Reviewed with mom the importance of notifying the physician if baby looks more jaundiced. Lactation office # given if follow-up needed, as well as other helpful resources. Encouraged to call with any concerns. Support and encouragement given.

## 2021-01-02 NOTE — PROGRESS NOTES
Notified Dr Lily Corea infant would not be going home today to due poor feedings. Patient's discharge order changed to tomorrow.

## 2021-08-05 ENCOUNTER — VIRTUAL VISIT (OUTPATIENT)
Dept: PRIMARY CARE CLINIC | Age: 26
End: 2021-08-05
Payer: COMMERCIAL

## 2021-08-05 ENCOUNTER — TELEPHONE (OUTPATIENT)
Dept: PRIMARY CARE CLINIC | Age: 26
End: 2021-08-05

## 2021-08-05 DIAGNOSIS — G89.29 OTHER CHRONIC PAIN: ICD-10-CM

## 2021-08-05 DIAGNOSIS — E03.9 ACQUIRED HYPOTHYROIDISM: Primary | ICD-10-CM

## 2021-08-05 DIAGNOSIS — E07.9 THYROID DISEASE: ICD-10-CM

## 2021-08-05 PROBLEM — Z34.90 NORMAL PREGNANCY, UNSPECIFIED TRIMESTER: Status: RESOLVED | Noted: 2020-12-30 | Resolved: 2021-08-05

## 2021-08-05 PROBLEM — Z3A.36 36 WEEKS GESTATION OF PREGNANCY: Status: RESOLVED | Noted: 2020-12-10 | Resolved: 2021-08-05

## 2021-08-05 PROBLEM — Z64.1 MULTIPARITY: Status: RESOLVED | Noted: 2020-12-10 | Resolved: 2021-08-05

## 2021-08-05 PROCEDURE — 99213 OFFICE O/P EST LOW 20 MIN: CPT | Performed by: FAMILY MEDICINE

## 2021-08-05 SDOH — ECONOMIC STABILITY: FOOD INSECURITY: WITHIN THE PAST 12 MONTHS, THE FOOD YOU BOUGHT JUST DIDN'T LAST AND YOU DIDN'T HAVE MONEY TO GET MORE.: NEVER TRUE

## 2021-08-05 SDOH — ECONOMIC STABILITY: FOOD INSECURITY: WITHIN THE PAST 12 MONTHS, YOU WORRIED THAT YOUR FOOD WOULD RUN OUT BEFORE YOU GOT MONEY TO BUY MORE.: NEVER TRUE

## 2021-08-05 ASSESSMENT — PATIENT HEALTH QUESTIONNAIRE - PHQ9
SUM OF ALL RESPONSES TO PHQ QUESTIONS 1-9: 0
SUM OF ALL RESPONSES TO PHQ9 QUESTIONS 1 & 2: 0
2. FEELING DOWN, DEPRESSED OR HOPELESS: 0
SUM OF ALL RESPONSES TO PHQ QUESTIONS 1-9: 0
1. LITTLE INTEREST OR PLEASURE IN DOING THINGS: 0
SUM OF ALL RESPONSES TO PHQ QUESTIONS 1-9: 0

## 2021-08-05 ASSESSMENT — SOCIAL DETERMINANTS OF HEALTH (SDOH): HOW HARD IS IT FOR YOU TO PAY FOR THE VERY BASICS LIKE FOOD, HOUSING, MEDICAL CARE, AND HEATING?: NOT HARD AT ALL

## 2021-08-05 ASSESSMENT — ENCOUNTER SYMPTOMS
ROS SKIN COMMENTS: NO HAIR LOSS  NO DRY SKIN  NO BRITTLE NAILS
DIARRHEA: 0
BACK PAIN: 1
CONSTIPATION: 0

## 2021-08-05 NOTE — PROGRESS NOTES
León Phelps, a female of 30 y.o.did a virtual visit on 8/5/2021. Patient Active Problem List   Diagnosis    Hirsutism    Thyroid disease          HPI hypothyroidism: found out when pregnant in Fall of 2020 by her high risk OB. Put on synthroid then. Last labs Dec 2020. Energy ok. No Known Allergies    Current Outpatient Medications on File Prior to Visit   Medication Sig Dispense Refill    NONFORMULARY CBD GUMMIES      ibuprofen (ADVIL;MOTRIN) 800 MG tablet Take 1 tablet by mouth every 8 hours as needed for Pain 120 tablet 3    levothyroxine (SYNTHROID) 100 MCG tablet Take 50 mcg by mouth Daily       No current facility-administered medications on file prior to visit. Review of Systems   Constitutional: Negative for fatigue and unexpected weight change. Cardiovascular: Negative for palpitations. Gastrointestinal: Negative for constipation and diarrhea. Endocrine: Negative for cold intolerance, heat intolerance, polydipsia and polyuria. Genitourinary: Negative for menstrual problem. Musculoskeletal: Positive for arthralgias, back pain and neck pain. Going to chiropractor in [place] for this. cbd gummies help. Skin:        No hair loss  No dry skin  No brittle nails   Neurological: Negative for tremors. Psychiatric/Behavioral: Negative for dysphoric mood. The patient is nervous/anxious. other review of systems reviewed and are negative    OBJECTIVE:  There were no vitals taken for this visit. Physical Exam  Constitutional:       General: She is not in acute distress. Appearance: Normal appearance. She is not ill-appearing, toxic-appearing or diaphoretic. HENT:      Head: Normocephalic. Eyes:      General: No scleral icterus. Pulmonary:      Effort: Pulmonary effort is normal.   Neurological:      Mental Status: She is alert and oriented to person, place, and time.    Psychiatric:         Mood and Affect: Mood normal.         ASSESSMENT AND PLAN:    Cely Arredondo

## 2021-08-05 NOTE — TELEPHONE ENCOUNTER
I would need to see her for since has been over a year since she has been here to the office. At that time we could draw the labs and go from there.

## 2021-08-05 NOTE — TELEPHONE ENCOUNTER
----- Message from Heike Adam sent at 8/5/2021  9:46 AM EDT -----  Subject: Message to Provider    QUESTIONS  Information for Provider? Patient is wanting to know if she is able to go   to a [facility] to get her thyroid levels drawn and then have the results   sent to Dr. Argelia Shipley so that she can get a refill on her thyroid   medication. Her OB was doing it, but now they are saying her PCP needs to   take over. ---------------------------------------------------------------------------  --------------  Ngoc CANELA  What is the best way for the office to contact you? OK to leave message on   voicemail  Preferred Call Back Phone Number? [phone]  ---------------------------------------------------------------------------  --------------  SCRIPT ANSWERS  Relationship to Patient?  Self

## 2021-08-12 ENCOUNTER — TELEPHONE (OUTPATIENT)
Dept: PRIMARY CARE CLINIC | Age: 26
End: 2021-08-12

## 2021-08-16 ENCOUNTER — TELEPHONE (OUTPATIENT)
Dept: PRIMARY CARE CLINIC | Age: 26
End: 2021-08-16

## 2021-08-16 RX ORDER — LEVOTHYROXINE SODIUM 112 UG/1
112 TABLET ORAL DAILY
Qty: 90 TABLET | Refills: 1 | Status: SHIPPED
Start: 2021-08-16 | End: 2022-02-07 | Stop reason: SDUPTHER

## 2021-08-16 RX ORDER — LEVOTHYROXINE SODIUM 112 UG/1
112 TABLET ORAL DAILY
Qty: 30 TABLET | Refills: 5 | Status: SHIPPED
Start: 2021-08-16 | End: 2021-08-16

## 2021-08-16 NOTE — TELEPHONE ENCOUNTER
----- Message from Ernst Weathers sent at 8/14/2021  9:31 AM EDT -----  Subject: Message to Provider    QUESTIONS  Information for Provider? Patient returning phone call from Dr Belgica Darby   and want to let Dr Belgica Darby to go ahead and refill Synthroid dose at   whatever he thinks will be correct for her. She go to the [facility]   ---------------------------------------------------------------------------  --------------  "[employer]  What is the best way for the office to contact you? OK to leave message on   voicemail  Preferred Call Back Phone Number? [phone]  ---------------------------------------------------------------------------  --------------  SCRIPT ANSWERS  Relationship to Patient?  Self

## 2021-11-09 ENCOUNTER — TELEPHONE (OUTPATIENT)
Dept: PRIMARY CARE CLINIC | Age: 26
End: 2021-11-09

## 2021-11-09 DIAGNOSIS — E03.9 ACQUIRED HYPOTHYROIDISM: Primary | ICD-10-CM

## 2021-11-09 NOTE — TELEPHONE ENCOUNTER
TSH level ordered. She can go to the lab and get this done and then we can refill her medicine or make any changes needed based on the lab.

## 2021-11-09 NOTE — TELEPHONE ENCOUNTER
Pt called stating she is going to need a refill on her thyroid medication but she stated you wanted to recheck her thyroid before you refill the medication to see if anything needed changed. . she stated she will go get the bw done. She also wanted you to know that she noticed a significant difference on the new dosage she is having a lot more energy!

## 2021-11-11 DIAGNOSIS — E03.9 ACQUIRED HYPOTHYROIDISM: ICD-10-CM

## 2021-11-11 LAB — TSH SERPL DL<=0.05 MIU/L-ACNC: 1.98 UIU/ML (ref 0.27–4.2)

## 2021-12-09 ENCOUNTER — NURSE TRIAGE (OUTPATIENT)
Dept: OTHER | Facility: CLINIC | Age: 26
End: 2021-12-09

## 2021-12-09 NOTE — TELEPHONE ENCOUNTER
Reason for Disposition   Information only question and nurse able to answer    Answer Assessment - Initial Assessment Questions  1. REASON FOR CALL or QUESTION: \"What is your reason for calling today? \" or \"How can I best help you? \" or \"What question do you have that I can help answer? \"      Patient had already called in and spoke to an RN regarding a Covid-19 positive diagnosis. Patient calling back to discuss monoclonal antibody infusion. Explained to patient that criteria and discussion has to be had with a provider. Patient sent back to [facility] to make virtual appointment. Protocols used: INFORMATION ONLY CALL - NO TRIAGE-ADULT-[place]    Received call from Jenny Cotton 894 at [facility] with Red Flag Complaint. Brief description of triage: See above. Care advice provided, patient verbalizes understanding; denies any other questions or concerns; instructed to call back for any new or worsening symptoms. Writer provided warm transfer to [address] at [facility] for appointment scheduling. Attention Provider: Thank you for allowing me to participate in the care of your patient. The patient was connected to triage in response to information provided to the ECC/PSC. Please do not respond through this encounter as the response is not directed to a shared pool.

## 2021-12-09 NOTE — TELEPHONE ENCOUNTER
Patient recently diagnosed with Covid. Having mild symptoms. Stated that friends told her to call her doctor. Patient stated that she does not feel she needs to be seen by doctor, but wasn't sure what to do. Writer encouraged patient to isolate and treat symptoms at home: stay hydrated, OTC Tylenol or Ibuprofen as directed on bottle and rest. Writer did advise patient to call back if any worsening symptoms or go to [facility] if need. Patient agreeable.     Reason for Disposition   Health Information question, no triage required and triager able to answer question    Protocols used: INFORMATION ONLY CALL - NO TRIAGE-ADULT-

## 2021-12-14 ENCOUNTER — TELEMEDICINE (OUTPATIENT)
Dept: PRIMARY CARE CLINIC | Age: 26
End: 2021-12-14
Payer: COMMERCIAL

## 2021-12-14 DIAGNOSIS — U07.1 COVID-19 VIRUS INFECTION: Primary | ICD-10-CM

## 2021-12-14 DIAGNOSIS — E03.9 ACQUIRED HYPOTHYROIDISM: ICD-10-CM

## 2021-12-14 DIAGNOSIS — F43.22 ADJUSTMENT DISORDER WITH ANXIOUS MOOD: ICD-10-CM

## 2021-12-14 PROCEDURE — 99214 OFFICE O/P EST MOD 30 MIN: CPT | Performed by: FAMILY MEDICINE

## 2021-12-14 RX ORDER — HYDROXYZINE PAMOATE 25 MG/1
25 CAPSULE ORAL 3 TIMES DAILY PRN
Qty: 30 CAPSULE | Refills: 0 | Status: SHIPPED | OUTPATIENT
Start: 2021-12-14 | End: 2021-12-28

## 2021-12-14 ASSESSMENT — ENCOUNTER SYMPTOMS
RHINORRHEA: 1
SINUS PAIN: 0
ROS SKIN COMMENTS: NO HAIR LOSS  NO DRY SKIN  NO BRITTLE NAILS
SORE THROAT: 0
SHORTNESS OF BREATH: 0
SINUS PRESSURE: 1
DIARRHEA: 0
COUGH: 1
CONSTIPATION: 0

## 2021-12-14 NOTE — PROGRESS NOTES
Myron Bertrand, a female of 27 y. o.did a virtual visit on 12/14/2021. Patient Active Problem List   Diagnosis    Hirsutism    Thyroid disease          Cough  This is a new problem. The current episode started in the past 7 days. The cough is non-productive. Associated symptoms include rhinorrhea. Pertinent negatives include no chills, ear pain, fever, headaches, postnasal drip, sore throat or shortness of breath. She has tried nothing for the symptoms. hypothyroidism: doing better on synthroid 112 mcg dose, ie more energy. Covid: tested + 12/8. Anxious since. At first very weak and dizzy that began on 12/5. No Known Allergies    Current Outpatient Medications on File Prior to Visit   Medication Sig Dispense Refill    levothyroxine (SYNTHROID) 112 MCG tablet TAKE 1 TABLET BY MOUTH DAILY 90 tablet 1    NONFORMULARY CBD GUMMIES      ibuprofen (ADVIL;MOTRIN) 800 MG tablet Take 1 tablet by mouth every 8 hours as needed for Pain 120 tablet 3     No current facility-administered medications on file prior to visit. Review of Systems   Constitutional: Positive for appetite change (decreased). Negative for activity change, chills, fatigue and fever. HENT: Positive for rhinorrhea and sinus pressure (nasal). Negative for congestion, ear pain, postnasal drip, sinus pain and sore throat. Loss of smell and taste for several days but getting better. Respiratory: Positive for cough. Negative for shortness of breath. Cardiovascular: Negative for palpitations. Gastrointestinal: Negative for constipation and diarrhea. Endocrine: Negative for cold intolerance, heat intolerance, polydipsia and polyuria. Skin:        No hair loss  No dry skin  No brittle nails   Neurological: Negative for tremors and headaches. Psychiatric/Behavioral: Negative for agitation, decreased concentration, dysphoric mood and sleep disturbance. The patient is nervous/anxious.     other review of systems reviewed and are negative    OBJECTIVE:  There were no vitals taken for this visit. Physical Exam  Constitutional:       General: She is not in acute distress. Appearance: Normal appearance. She is not ill-appearing, toxic-appearing or diaphoretic. HENT:      Head: Normocephalic. Eyes:      General: No scleral icterus. Pulmonary:      Effort: Pulmonary effort is normal.   Neurological:      Mental Status: She is alert and oriented to person, place, and time. Psychiatric:         Mood and Affect: Mood normal.         ASSESSMENT AND PLAN:    Diagnoses and all orders for this visit:    COVID-19 virus infection    Acquired hypothyroidism    Adjustment disorder with anxious mood  -     hydrOXYzine (VISTARIL) 25 MG capsule; Take 1 capsule by mouth 3 times daily as needed for Anxiety    - recommend covid vaccine in future  - delsym prn cough. - continue current thyorid med. - keep hydrated. - tylenol prn    Return in about 6 months (around 6/14/2022). Mike Josue,     TeleMedicine Patient Consent    This visit was performed as a virtual video visit using a synchronous, two-way, audio-video telehealth technology platform. Patient identification was verified at the start of the visit, including the patient's telephone number and physical location. I discussed with the patient the nature of our telehealth visits, that:     1. Due to the nature of an audio- video modality, the only components of a physical exam that could be done are the elements supported by direct observation. 2. I would evaluate the patient and recommend diagnostics and treatments based on my assessment. 3. If it was felt that the patient should be evaluated in clinic or an emergency room setting, then they would be directed there. 4. Our sessions are not being recorded and that personal health information is protected. 5. Our team would provide follow up care in person if/when the patient needs it.        Patient does

## 2022-02-07 RX ORDER — LEVOTHYROXINE SODIUM 112 UG/1
112 TABLET ORAL DAILY
Qty: 90 TABLET | Refills: 1 | Status: SHIPPED
Start: 2022-02-07 | End: 2022-02-24

## 2022-02-21 ENCOUNTER — TELEPHONE (OUTPATIENT)
Dept: PRIMARY CARE CLINIC | Age: 27
End: 2022-02-21

## 2022-02-21 NOTE — TELEPHONE ENCOUNTER
Since she picked up her new script of synthroid, and took it she has been having odd reactions,she has never had before with the medication, reactions including nausea, dizziness. Heart flutters, she stated she is not pregnant so it cant be that she just started her period.

## 2022-02-21 NOTE — TELEPHONE ENCOUNTER
Take the medication for another week and see if it does not get better. If still having the symptoms let us know.

## 2022-02-22 NOTE — TELEPHONE ENCOUNTER
If she wants I could call in the branded Synthroid. However this may be more expensive.   Let me know

## 2022-02-23 ENCOUNTER — TELEPHONE (OUTPATIENT)
Dept: PRIMARY CARE CLINIC | Age: 27
End: 2022-02-23

## 2022-02-23 DIAGNOSIS — E03.9 ACQUIRED HYPOTHYROIDISM: Primary | ICD-10-CM

## 2022-02-23 NOTE — TELEPHONE ENCOUNTER
----- Message from Vivianne Severance sent at 2/22/2022  5:13 PM EST -----  Subject: Message to Provider    QUESTIONS  Information for Provider? Patient returned Ivon Estes call from Dr Krystal Cardoso   please call back. Patient want to get blood done before taking a new   medication  ---------------------------------------------------------------------------  --------------  CALL BACK INFO  What is the best way for the office to contact you? OK to leave message on   voicemail  Preferred Call Back Phone Number?  [phone]  ---------------------------------------------------------------------------  --------------  SCRIPT ANSWERS  undefined

## 2022-02-24 RX ORDER — LEVOTHYROXINE SODIUM 112 MCG
112 TABLET ORAL DAILY
Qty: 90 TABLET | Refills: 1 | Status: SHIPPED
Start: 2022-02-24 | End: 2022-07-01

## 2022-02-25 DIAGNOSIS — E03.9 ACQUIRED HYPOTHYROIDISM: ICD-10-CM

## 2022-02-25 LAB
T4 FREE: 1.09 NG/DL (ref 0.93–1.7)
TSH SERPL DL<=0.05 MIU/L-ACNC: 3.16 UIU/ML (ref 0.27–4.2)

## 2022-04-18 NOTE — L&D DELIVERY NOTE
Delivery note   of viable female at 1502 small second degree vaginal lac    Repair  2 0 vircyl  apgars 8 9 bw  8 1 spontaneous placenta iv pit  ebl 200cc mom and baby satis conditon Show Aperture Variable?: Yes Detail Level: Detailed Render Post-Care Instructions In Note?: no Duration Of Freeze Thaw-Cycle (Seconds): 0 Post-Care Instructions: I reviewed with the patient in detail post-care instructions. Patient is to wear sunprotection, and avoid picking at any of the treated lesions. Pt may apply Vaseline to crusted or scabbing areas. Consent: The patient's consent was obtained including but not limited to risks of crusting, scabbing, blistering, scarring, darker or lighter pigmentary change, recurrence, incomplete removal and infection. Spray Paint Text: The liquid nitrogen was applied to the skin utilizing a spray paint frosting technique. Medical Necessity Information: It is in your best interest to select a reason for this procedure from the list below. All of these items fulfill various CMS LCD requirements except the new and changing color options. Medical Necessity Clause: This procedure was medically necessary because the lesions that were treated were:

## 2022-07-01 ENCOUNTER — OFFICE VISIT (OUTPATIENT)
Dept: PRIMARY CARE CLINIC | Age: 27
End: 2022-07-01
Payer: COMMERCIAL

## 2022-07-01 VITALS
WEIGHT: 245 LBS | DIASTOLIC BLOOD PRESSURE: 86 MMHG | OXYGEN SATURATION: 99 % | RESPIRATION RATE: 16 BRPM | SYSTOLIC BLOOD PRESSURE: 122 MMHG | HEIGHT: 61 IN | BODY MASS INDEX: 46.26 KG/M2 | TEMPERATURE: 97.4 F | HEART RATE: 98 BPM

## 2022-07-01 DIAGNOSIS — M79.604 PAIN IN BOTH LOWER EXTREMITIES: Primary | ICD-10-CM

## 2022-07-01 DIAGNOSIS — M79.605 PAIN IN BOTH LOWER EXTREMITIES: Primary | ICD-10-CM

## 2022-07-01 DIAGNOSIS — M72.2 PLANTAR FASCIITIS OF RIGHT FOOT: ICD-10-CM

## 2022-07-01 PROCEDURE — 99213 OFFICE O/P EST LOW 20 MIN: CPT | Performed by: FAMILY MEDICINE

## 2022-07-01 PROCEDURE — G8417 CALC BMI ABV UP PARAM F/U: HCPCS | Performed by: FAMILY MEDICINE

## 2022-07-01 PROCEDURE — 1036F TOBACCO NON-USER: CPT | Performed by: FAMILY MEDICINE

## 2022-07-01 PROCEDURE — G8427 DOCREV CUR MEDS BY ELIG CLIN: HCPCS | Performed by: FAMILY MEDICINE

## 2022-07-01 ASSESSMENT — PATIENT HEALTH QUESTIONNAIRE - PHQ9
2. FEELING DOWN, DEPRESSED OR HOPELESS: 0
SUM OF ALL RESPONSES TO PHQ QUESTIONS 1-9: 0
1. LITTLE INTEREST OR PLEASURE IN DOING THINGS: 0
SUM OF ALL RESPONSES TO PHQ QUESTIONS 1-9: 0
SUM OF ALL RESPONSES TO PHQ9 QUESTIONS 1 & 2: 0
SUM OF ALL RESPONSES TO PHQ QUESTIONS 1-9: 0
SUM OF ALL RESPONSES TO PHQ QUESTIONS 1-9: 0

## 2022-07-01 ASSESSMENT — ENCOUNTER SYMPTOMS: BACK PAIN: 0

## 2022-07-01 NOTE — PATIENT INSTRUCTIONS
Patient Education        Plantar Fasciitis: Exercises  Introduction  Here are some examples of exercises for you to try. The exercises may be suggested for a condition or for rehabilitation. Start each exercise slowly. Ease off the exercises if you start to have pain. You will be told when to start these exercises and which ones will work bestfor you. How to do the exercises  Towel stretch    1. Sit with your legs extended and knees straight. 2. Place a towel around your foot just under the toes. 3. Hold each end of the towel in each hand, with your hands above your knees. 4. Pull back with the towel so that your foot stretches toward you. 5. Hold the position for at least 15 to 30 seconds. 6. Repeat 2 to 4 times a session, up to 5 sessions a day. Calf stretch    This exercise stretches the muscles at the back of the lower leg (the calf) andthe Achilles tendon. Do this exercise 3 or 4 times a day, 5 days a week. 1. Stand facing a wall with your hands on the wall at about eye level. Put the leg you want to stretch about a step behind your other leg. 2. Keeping your back heel on the floor, bend your front knee until you feel a stretch in the back leg. 3. Hold the stretch for 15 to 30 seconds. Repeat 2 to 4 times. Plantar fascia and calf stretch    Stretching the plantar fascia and calf muscles can increase flexibility and decrease heel pain. You can do this exercise several times each day and beforeand after activity. 1. Stand on a step as shown above. Be sure to hold on to the banister. 2. Slowly let your heels down over the edge of the step as you relax your calf muscles. You should feel a gentle stretch across the bottom of your foot and up the back of your leg to your knee. 3. Hold the stretch about 15 to 30 seconds, and then tighten your calf muscle a little to bring your heel back up to the level of the step. Repeat 2 to 4 times.   Towel curls    Make this exercise more challenging by placing a weighted object, such as asoup can, on the other end of the towel. 1. While sitting, place your foot on a towel on the floor and scrunch the towel toward you with your toes. 2. Then, also using your toes, push the towel away from you. Albin pickups    1. Put marbles on the floor next to a cup.  2. Using your toes, try to lift the marbles up from the floor and put them in the cup. Follow-up care is a key part of your treatment and safety. Be sure to make and go to all appointments, and call your doctor if you are having problems. It's also a good idea to know your test results and keep alist of the medicines you take. Where can you learn more? Go to [email or web address] and sign in to your [employer] account. Enter U068 in the [employer] box to learn more about \"Plantar Fasciitis: Exercises. \"     If you do not have an account, please click on the \"Sign Up Now\" link. Current as of: March 9, 2022               Content Version: 13.3  © 5411-3975 [employer]wise, Incorporated. Care instructions adapted under license by [facility] [place] ([facility]). If you have questions about a medical condition or this instruction, always ask your healthcare professional. [address] any warranty or liability for your use of this information.

## 2022-07-01 NOTE — PROGRESS NOTES
Chapincito Mayer, a female of 27 y.o. came to the office 7/1/2022. Patient Active Problem List   Diagnosis    Hirsutism    Thyroid disease          Leg Pain   There was no injury mechanism. The pain is present in the left leg and right leg (in different spots. ). The quality of the pain is described as aching (dull). Pertinent negatives include no muscle weakness, numbness or tingling. Nothing aggravates the symptoms. Heel pain: R for yrs. Getting a brace to wear hs. No Known Allergies    Current Outpatient Medications on File Prior to Visit   Medication Sig Dispense Refill    Prenatal MV-Min-Fe Fum-FA-DHA (PRENATAL 1 PO) Take by mouth daily      SYNTHROID 112 MCG tablet Take 1 tablet by mouth Daily (Patient not taking: Reported on 5/20/2022) 90 tablet 1     No current facility-administered medications on file prior to visit. Review of Systems   Musculoskeletal: Negative for arthralgias, back pain and gait problem. Neurological: Negative for tingling and numbness. other review of systems reviewed and are negative    OBJECTIVE:  /86   Pulse 98   Temp 97.4 °F (36.3 °C)   Resp 16   Ht 5' 1\" (1.549 m)   Wt 245 lb (111.1 kg)   LMP  (Approximate)   SpO2 99%   BMI 46.29 kg/m²      Physical Exam  Constitutional:       General: She is not in acute distress. Appearance: Normal appearance. She is not ill-appearing, toxic-appearing or diaphoretic. HENT:      Head: Normocephalic. Eyes:      General: No scleral icterus. Pulmonary:      Effort: Pulmonary effort is normal.   Musculoskeletal:      Right lower leg: No swelling, tenderness or bony tenderness. No edema. Left lower leg: No swelling, tenderness or bony tenderness. No edema. Right foot: Tenderness (over medial heel plantar surface. ) present. Neurological:      Mental Status: She is alert and oriented to person, place, and time.    Psychiatric:         Mood and Affect: Mood normal.         ASSESSMENT AND PLAN:    Bouchra Li was seen today for leg pain. Diagnoses and all orders for this visit:    Pain in both lower extremities    Plantar fasciitis of right foot    - HO heel exercises do daily  - ice rolls to heel daily  - wear night brace hs when gets  - discussed injection but she refuses. - tylenol prn pain. - I wonder if leg pain is due to heel issues. Return if symptoms worsen or fail to improve.     Ed Priyanka Acevedo, DO

## 2022-12-27 ENCOUNTER — OFFICE VISIT (OUTPATIENT)
Dept: ENDOCRINOLOGY | Age: 27
End: 2022-12-27
Payer: COMMERCIAL

## 2022-12-27 VITALS
BODY MASS INDEX: 47.95 KG/M2 | HEART RATE: 103 BPM | WEIGHT: 254 LBS | DIASTOLIC BLOOD PRESSURE: 82 MMHG | HEIGHT: 61 IN | SYSTOLIC BLOOD PRESSURE: 130 MMHG | OXYGEN SATURATION: 98 %

## 2022-12-27 DIAGNOSIS — E03.9 ACQUIRED HYPOTHYROIDISM: ICD-10-CM

## 2022-12-27 DIAGNOSIS — Z3A.30 30 WEEKS GESTATION OF PREGNANCY: ICD-10-CM

## 2022-12-27 DIAGNOSIS — E03.9 ACQUIRED HYPOTHYROIDISM: Primary | ICD-10-CM

## 2022-12-27 PROCEDURE — 99204 OFFICE O/P NEW MOD 45 MIN: CPT | Performed by: CLINICAL NURSE SPECIALIST

## 2022-12-27 PROCEDURE — 1036F TOBACCO NON-USER: CPT | Performed by: CLINICAL NURSE SPECIALIST

## 2022-12-27 PROCEDURE — G8427 DOCREV CUR MEDS BY ELIG CLIN: HCPCS | Performed by: CLINICAL NURSE SPECIALIST

## 2022-12-27 PROCEDURE — G8484 FLU IMMUNIZE NO ADMIN: HCPCS | Performed by: CLINICAL NURSE SPECIALIST

## 2022-12-27 PROCEDURE — G8417 CALC BMI ABV UP PARAM F/U: HCPCS | Performed by: CLINICAL NURSE SPECIALIST

## 2022-12-27 RX ORDER — LEVOTHYROXINE SODIUM 25 MCG
25 TABLET ORAL DAILY
Qty: 30 TABLET | Refills: 3
Start: 2022-12-27

## 2022-12-27 NOTE — PROGRESS NOTES
[address] [place] [address] Department of Endocrinology Diabetes and Metabolism   [address] [facility] [address]   Phone: [phone]  Fax: [phone]    Date of Service: 12/27/2022    Primary Care Physician: Conner Garcia DO  Referring physician: TERENCE Wen - *  Provider: TERENCE Braxton - CNS     Reason for the visit:  Hypothyroidism       History of Present Illness: The history is provided by the patient. No  was used. Accuracy of the patient data is excellent. Torrey Rush is a very pleasant 32 y.o. female seen today for diabetes management     Pt 1st diagnosed with hypothyroidism on dx in 2020 dx during pregnancy. She was on Levothyroxine but this was stopped 3/2022 as pt was having heart palpations  She was started on NP thyroid 15 mg daily. She started developing heart palpitations, dizziness, nausea 2 weeks ago. Stopped taking 2 weeks ago   Per records I do not see any labs that showed over treatment   Symptoms consistent with hypothyroidism include fatigue. Treatment that the patient has had includes none now  . Associated symptoms include denies . Last TSH was   Lab Results   Component Value Date    TSH 3.480 12/16/2022    FT3 3.0 12/16/2022    T4FREE 1.03 12/16/2022     Currently 30 weeks gestation   Denies family hx of thyroid disease       PAST MEDICAL HISTORY   Past Medical History:   Diagnosis Date    COVID-19 virus infection 12/09/2021    Diabetes mellitus ([address]     prev pregnancy    Thyroid disease     hypothyroidism       PAST SURGICAL HISTORY   No past surgical history on file. SOCIAL HISTORY   Tobacco:   reports that she has never smoked. She has never used smokeless tobacco.  Alcohol:   reports that she does not currently use alcohol. Drugs:   reports that she does not currently use drugs. FAMILY HISTORY   No family history on file.     ALLERGIES AND DRUG REACTIONS   No Known Allergies    CURRENT MEDICATIONS   Current Outpatient Medications   Medication Sig Dispense Refill    SYNTHROID 25 MCG tablet Take 1 tablet by mouth Daily 30 tablet 3    Prenatal MV-Min-Fe Fum-FA-DHA (PRENATAL 1 PO) Take by mouth daily      thyroid (NP THYROID) 15 MG tablet TAKE 1 TABLET BY MOUTH ONCE DAILY IN THE MORNING (Patient not taking: No sig reported) 30 tablet 1     No current facility-administered medications for this visit. Review of Systems  Constitutional: No fever, no chills, no diaphoresis, no generalized weakness. HEENT: No blurred vision, No sore throat, no ear pain, no hair loss  Neck: denied any neck swelling, difficulty swallowing,   Cardio-pulmonary: No CP, SOB or palpitation, No orthopnea or PND. No cough or wheezing. GI: No N/V/D, no constipation, No abdominal pain, no melena or hematochezia   : Denied any dysuria, hematuria, flank pain, discharge, or incontinence. Skin: denied any rash, ulcer, Hirsute, or hyperpigmentation. MSK: denied any joint deformity, joint pain/swelling, muscle pain, or back pain. Neuro: no numbness, no tingling, no weakness, _    OBJECTIVE    /82   Pulse (!) 103   Ht 5' 1\" (1.549 m)   Wt 254 lb (115.2 kg)   LMP 06/01/2022 (Exact Date)   SpO2 98%   BMI 47.99 kg/m²   BP Readings from Last 4 Encounters:   12/27/22 130/82   12/16/22 120/84   12/01/22 128/66   11/03/22 134/70     Wt Readings from Last 6 Encounters:   12/27/22 254 lb (115.2 kg)   12/16/22 254 lb 12.8 oz (115.6 kg)   12/01/22 254 lb 3.2 oz (115.3 kg)   11/03/22 250 lb (113.4 kg)   09/28/22 246 lb 3.2 oz (111.7 kg)   09/07/22 244 lb 1.6 oz (110.7 kg)       Physical examination:  General: awake alert, oriented x3, no abnormal position or movements. HEENT: normocephalic non-traumatic, no exophthalmos   Neck: supple, no LN enlargement, no thyromegaly, no thyroid tenderness, no JVD. Pulm: Clear equal air entry no added sounds, no wheezing or rhonchi    CVS: S1 + S2, no murmur, no heave.  Dorsalis pedis pulse palpable   Abd: soft lax, no tenderness, no organomegaly, audible bowel sounds. Skin: warm, no lesions, no rash. Musculoskeletal: No back tenderness, no kyphosis/scoliosis    Neuro: CN intact,  , muscle power normal  Psych: normal mood, and affect      Review of Laboratory Data:  I personally reviewed the following lab:  Lab Results   Component Value Date/Time    WBC 12.6 (H) 12/16/2022 09:30 AM    RBC 4.14 12/16/2022 09:30 AM    HGB 12.2 12/16/2022 09:30 AM    HCT 36.7 12/16/2022 09:30 AM    MCV 88.6 12/16/2022 09:30 AM    MCH 29.5 12/16/2022 09:30 AM    MCHC 33.2 12/16/2022 09:30 AM    RDW 14.6 12/16/2022 09:30 AM     12/16/2022 09:30 AM    MPV 11.9 12/16/2022 09:30 AM      Lab Results   Component Value Date/Time     05/04/2017 10:10 AM    K 4.3 05/04/2017 10:10 AM    CO2 20 (L) 05/04/2017 10:10 AM    BUN 8 05/04/2017 10:10 AM    CREATININE 0.6 05/04/2017 10:10 AM    CALCIUM 9.1 05/04/2017 10:10 AM    LABGLOM >60 05/04/2017 10:10 AM    GFRAA >60 05/04/2017 10:10 AM      Lab Results   Component Value Date/Time    TSH 3.480 12/16/2022 09:30 AM    T4FREE 1.03 12/16/2022 09:30 AM    FT3 3.0 12/16/2022 09:30 AM     Lab Results   Component Value Date/Time    LABA1C 5.7 08/03/2022 12:00 PM    GLUCOSE 81 05/04/2017 10:10 AM     Lab Results   Component Value Date/Time    LABA1C 5.7 08/03/2022 12:00 PM     No results found for: TRIG, HDL, LDLCALC, CHOL  Lab Results   Component Value Date/Time    VITD25 21 04/24/2014 11:38 AM       ASSESSMENT & RECOMMENDATIONS   Emily Gilmore, a 32 y.o.-old female seen in for the following issues       Assessment:      Diagnosis Orders   1. Acquired hypothyroidism  THYROID PEROXIDASE ANTIBODY    T4, Free    TSH      2. 30 weeks gestation of pregnancy            Plan:     1.  Acquired hypothyroidism   Patient was previously on levothyroxine in the past and developed heart palpitations  This was stopped 3/2022 and patient was started on NP thyroid 15 mg daily  She started developing heart palpitations 2 weeks ago and this was stopped  Patient is not currently on thyroid hormone replacement at this point  Patient is 30 weeks gestation in pregnancy  Will start brand Synthroid 25 mcg 1 tablet once daily  Advised patient to take on an empty stomach at least 30 minutes before breakfast and without combination of other medications  Will check TPO antibody  Will repeat TFTs in 4 weeks  Further recommendations will be made after results are obtained  Counseled on the importance of optimal thyroid function in pregnancy  Patient verbalized understanding  Goal TSH < 2.5   2. 30 weeks gestation of pregnancy            I personally spent > 45 minutes reviewing  external notes from PCP and other patient's care team providers, and personally interpreted labs associated with the above diagnosis. I also ordered labs to further assess and manage the above addressed medical conditions. Return in about 3 months (around 3/27/2023). The above issues were reviewed with the patient who understood and agreed with the plan. Thank you for allowing us to participate in the care of this patient. Please do not hesitate to contact us with any additional questions. TERENCE Sepulveda     [facility] End[facility]   [address] [place], [place] [address]   Phone: [phone]  Fax: [phone]  --------------------------------------------  An electronic signature was used to authenticate this note.  TERENCE Sepulveda on 12/27/2022 at 9:36 AM

## 2022-12-30 LAB — THYROID PEROXIDASE (TPO) ABS: 30 IU/ML (ref 0–25)

## 2023-01-04 DIAGNOSIS — E03.9 ACQUIRED HYPOTHYROIDISM: Primary | ICD-10-CM

## 2023-01-04 RX ORDER — LEVOTHYROXINE SODIUM 25 MCG
25 TABLET ORAL DAILY
Qty: 30 TABLET | Refills: 3 | Status: SHIPPED | OUTPATIENT
Start: 2023-01-04

## 2023-01-04 NOTE — TELEPHONE ENCOUNTER
----- Message from TERENCE Damon sent at 1/3/2023  8:22 AM EST -----  Please call patient and inform her TPO antibody is positive consistent with Hashimoto's.   Continue with plan as discussed in office visit

## 2023-01-23 ENCOUNTER — INITIAL PRENATAL (OUTPATIENT)
Dept: OBGYN CLINIC | Age: 28
End: 2023-01-23
Payer: COMMERCIAL

## 2023-01-23 ENCOUNTER — ANCILLARY PROCEDURE (OUTPATIENT)
Dept: OBGYN CLINIC | Age: 28
End: 2023-01-23
Payer: COMMERCIAL

## 2023-01-23 VITALS
BODY MASS INDEX: 48.37 KG/M2 | WEIGHT: 256 LBS | SYSTOLIC BLOOD PRESSURE: 116 MMHG | HEART RATE: 118 BPM | DIASTOLIC BLOOD PRESSURE: 78 MMHG

## 2023-01-23 DIAGNOSIS — Z3A.33 33 WEEKS GESTATION OF PREGNANCY: ICD-10-CM

## 2023-01-23 DIAGNOSIS — O36.63X0 EXCESSIVE FETAL GROWTH AFFECTING MANAGEMENT OF PREGNANCY IN THIRD TRIMESTER, SINGLE OR UNSPECIFIED FETUS: ICD-10-CM

## 2023-01-23 DIAGNOSIS — Z34.90 THIRD PREGNANCY: ICD-10-CM

## 2023-01-23 DIAGNOSIS — Z34.90 PREGNANCY, UNSPECIFIED GESTATIONAL AGE: Primary | ICD-10-CM

## 2023-01-23 DIAGNOSIS — O40.3XX0 POLYHYDRAMNIOS IN THIRD TRIMESTER COMPLICATION, SINGLE OR UNSPECIFIED FETUS: ICD-10-CM

## 2023-01-23 LAB
GLUCOSE URINE, POC: NORMAL
PROTEIN UA: NEGATIVE

## 2023-01-23 PROCEDURE — 76811 OB US DETAILED SNGL FETUS: CPT | Performed by: OBSTETRICS & GYNECOLOGY

## 2023-01-23 PROCEDURE — 99999 PR OFFICE/OUTPT VISIT,PROCEDURE ONLY: CPT | Performed by: OBSTETRICS & GYNECOLOGY

## 2023-01-23 PROCEDURE — 81002 URINALYSIS NONAUTO W/O SCOPE: CPT | Performed by: OBSTETRICS & GYNECOLOGY

## 2023-01-23 PROCEDURE — 99203 OFFICE O/P NEW LOW 30 MIN: CPT | Performed by: OBSTETRICS & GYNECOLOGY

## 2023-01-23 NOTE — PROGRESS NOTES
Patient ob new visit. Denies any vaginal bleeding, cramping, or leakage of fluids. Patient reports good fetal movement.

## 2023-01-23 NOTE — PROGRESS NOTES
[address] [facility]  [address] [facility] [address] [phone]   [address] [facility]  [address] [facility] [address]  Dept: [phone]  Loc: [phone]     2023    RE:  Chaya Barrett     : 1995   AGE: 32 y.o. Dear Dr. Urban Emerson,    Thank you for allowing me to see Chaya Barrett. As I'm sure you will recall, Chaya Barrett is a 32 y.o. [id number]'s last menstrual period was 2022 (exact date). Estimated Date of Delivery: 3/7/23 at 33w6d seen in our office today for the following:    REASON FOR VISIT: Growth and biophysical profile    Patient Active Problem List    Diagnosis Date Noted    33 weeks gestation of pregnancy 2023    BMI 40.0-44.9, adult ([facility] [address] 2023    Third pregnancy 2023    Polyhydramnios in third trimester 2023    Thyroid disease     Hirsutism 2012        PAST HISTORY:  OB History    Para Term  AB Living   3 2 2     2   SAB IAB Ectopic Molar Multiple Live Births           0 2      # Outcome Date GA Lbr [place]/2nd Weight Sex Delivery Anes PTL Lv   3 Current            2 Term 20 39w1d / 01:08 8 lb 0.8 oz (3.65 kg) F Vag-Spont EPI N BERNARD   1 Term 18   8 lb 1 oz (3.657 kg) M Vag-Spont  N BERNARD          MEDICAL:  Past Medical History:   Diagnosis Date    Abnormal Pap smear of cervix     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 No pertinent surgical history.     ALLERGIES:    No Known Allergies      MEDICATIONS:    Current Outpatient Medications   Medication Sig Dispense Refill    SYNTHROID 25 MCG tablet Take 1 tablet by mouth Daily 30 tablet 3    Prenatal MV-Min-Fe Fum-FA-DHA (PRENATAL 1 PO) Take by mouth daily       No current facility-administered medications for this visit. Social History     Socioeconomic History    Marital status:      Spouse name: None    Number of children: None    Years of education: None    Highest education level: None   Tobacco Use    Smoking status: Never    Smokeless tobacco: Never   Vaping Use    Vaping Use: Never used   Substance and Sexual Activity    Alcohol use: Not Currently    Drug use: Not Currently    Sexual activity: Yes     Partners: Male          FAMILY MEDICAL HISTORY:   History reviewed. No pertinent family history. PHYSICAL EXAMINATION:  /78   Pulse (!) 118   Wt 256 lb (116.1 kg)   LMP 06/01/2022 (Exact Date)   Body mass index is 48.37 kg/m². Urine dipstick:  Results for POC orders placed in visit on 01/23/23   POCT urine qual dipstick protein   Result Value Ref Range    Protein, UA Negative Negative   POCT urine qual dipstick glucose   Result Value Ref Range    Glucose, UA POC neg         A/an growth and biophysical profile ultrasound was done in our office today. Please refer to the enclosed copy of the ultrasound report for further information. Discussion:  There is a payne fetus in a vertex presentation. Fetal cardiac motion fetal motion and fetal tone was observed and appeared to be grossly normal.  The baby is large for gestational age. Estimated fetal weight is at the 92nd percentile 2901 g or 6 pounds 6 ounces. The placenta is anterior. Amniotic fluid volume is slightly increased with an LORNA of 26. IMPRESSION:  1. Single intrauterine gestation at 33+ weeks with biometry consistent with dates. 2.  No obvious fetal anomalies are noted. The fetus is in a vertex presentation.   3.  The amniotic fluid volume is slightly increased and the placenta is anterior. RECOMMENDATIONS:  Each of the recommendations were discussed with the patient:  1. Consider weekly biophysical profiles and NSTs due to the increased BMI. 2.  Consider delivery at 39 weeks gestation. 3.  Growth ultrasounds every 4 weeks. 4.  Follow-up would be otherwise as clinically indicated. The patient is to continue to follow with you in your office for ongoing obstetric care. PLAN:    As noted above or sooner prn.     Sincerely,        Dick Westfall MD

## 2023-01-24 DIAGNOSIS — E03.9 ACQUIRED HYPOTHYROIDISM: ICD-10-CM

## 2023-01-24 DIAGNOSIS — E03.9 ACQUIRED HYPOTHYROIDISM: Primary | ICD-10-CM

## 2023-01-24 LAB
T4 FREE: 0.85 NG/DL (ref 0.93–1.7)
TSH SERPL DL<=0.05 MIU/L-ACNC: 1.79 UIU/ML (ref 0.27–4.2)

## 2023-01-24 RX ORDER — LEVOTHYROXINE SODIUM 25 MCG
TABLET ORAL
Qty: 34 TABLET | Refills: 3 | Status: SHIPPED
Start: 2023-01-24 | End: 2023-03-20 | Stop reason: SDUPTHER

## 2023-01-25 ENCOUNTER — TELEPHONE (OUTPATIENT)
Dept: ENDOCRINOLOGY | Age: 28
End: 2023-01-25

## 2023-01-25 NOTE — TELEPHONE ENCOUNTER
----- Message from TERENCE Macedo - CNS sent at 1/24/2023  4:22 PM EST -----  Please call patient and inform her TSH is 1.7 and at goal however FT4 was low. Please have pt increase LT4 to 25 mcg 1 tab Monday thru Saturday, 2 tablets on Sunday.   Rx sent

## 2023-03-02 ENCOUNTER — ANESTHESIA (OUTPATIENT)
Dept: LABOR AND DELIVERY | Age: 28
End: 2023-03-02
Payer: COMMERCIAL

## 2023-03-02 ENCOUNTER — HOSPITAL ENCOUNTER (INPATIENT)
Age: 28
LOS: 2 days | Discharge: HOME | End: 2023-03-04
Attending: OBSTETRICS & GYNECOLOGY | Admitting: OBSTETRICS & GYNECOLOGY
Payer: COMMERCIAL

## 2023-03-02 ENCOUNTER — APPOINTMENT (OUTPATIENT)
Dept: LABOR AND DELIVERY | Age: 28
End: 2023-03-02
Payer: COMMERCIAL

## 2023-03-02 ENCOUNTER — ANESTHESIA EVENT (OUTPATIENT)
Dept: LABOR AND DELIVERY | Age: 28
End: 2023-03-02
Payer: COMMERCIAL

## 2023-03-02 PROBLEM — E06.3 HYPOTHYROIDISM DUE TO HASHIMOTO'S THYROIDITIS: Status: ACTIVE | Noted: 2023-03-02

## 2023-03-02 PROBLEM — E03.8 HYPOTHYROIDISM DUE TO HASHIMOTO'S THYROIDITIS: Status: ACTIVE | Noted: 2023-03-02

## 2023-03-02 PROBLEM — O36.63X0 EXCESSIVE FETAL GROWTH AFFECTING MANAGEMENT OF MOTHER IN SINGLETON PREGNANCY IN THIRD TRIMESTER, ANTEPARTUM: Status: ACTIVE | Noted: 2023-03-02

## 2023-03-02 PROBLEM — E03.9 HYPOTHYROIDISM COMPLICATING PREGNANCY IN THIRD TRIMESTER: Status: ACTIVE | Noted: 2023-03-02

## 2023-03-02 PROBLEM — Z3A.39 39 WEEKS GESTATION OF PREGNANCY: Status: ACTIVE | Noted: 2023-03-02

## 2023-03-02 PROBLEM — O99.213 OBESITY COMPLICATING PREGNANCY IN THIRD TRIMESTER: Status: ACTIVE | Noted: 2023-03-02

## 2023-03-02 PROBLEM — O09.293 HISTORY OF GESTATIONAL DIABETES IN PRIOR PREGNANCY, CURRENTLY PREGNANT IN THIRD TRIMESTER: Status: ACTIVE | Noted: 2023-03-02

## 2023-03-02 PROBLEM — Z3A.33 33 WEEKS GESTATION OF PREGNANCY: Status: RESOLVED | Noted: 2023-01-23 | Resolved: 2023-03-02

## 2023-03-02 PROBLEM — Z86.32 HISTORY OF GESTATIONAL DIABETES IN PRIOR PREGNANCY, CURRENTLY PREGNANT IN THIRD TRIMESTER: Status: ACTIVE | Noted: 2023-03-02

## 2023-03-02 PROBLEM — O99.283 HYPOTHYROIDISM COMPLICATING PREGNANCY IN THIRD TRIMESTER: Status: ACTIVE | Noted: 2023-03-02

## 2023-03-02 LAB
ABO/RH: NORMAL
AMPHETAMINE SCREEN, URINE: NOT DETECTED
ANTIBODY SCREEN: NORMAL
BARBITURATE SCREEN URINE: NOT DETECTED
BENZODIAZEPINE SCREEN, URINE: NOT DETECTED
CANNABINOID SCREEN URINE: NOT DETECTED
COCAINE METABOLITE SCREEN URINE: NOT DETECTED
FENTANYL SCREEN, URINE: NOT DETECTED
HCT VFR BLD CALC: 36.1 % (ref 34–48)
HEMOGLOBIN: 11.9 G/DL (ref 11.5–15.5)
Lab: NORMAL
MCH RBC QN AUTO: 29.1 PG (ref 26–35)
MCHC RBC AUTO-ENTMCNC: 33 % (ref 32–34.5)
MCV RBC AUTO: 88.3 FL (ref 80–99.9)
METHADONE SCREEN, URINE: NOT DETECTED
OPIATE SCREEN URINE: NOT DETECTED
OXYCODONE URINE: NOT DETECTED
PDW BLD-RTO: 14.9 FL (ref 11.5–15)
PHENCYCLIDINE SCREEN URINE: NOT DETECTED
PLATELET # BLD: 163 E9/L (ref 130–450)
PMV BLD AUTO: 12.9 FL (ref 7–12)
RBC # BLD: 4.09 E12/L (ref 3.5–5.5)
WBC # BLD: 10.2 E9/L (ref 4.5–11.5)

## 2023-03-02 PROCEDURE — 51701 INSERT BLADDER CATHETER: CPT

## 2023-03-02 PROCEDURE — 86900 BLOOD TYPING SEROLOGIC ABO: CPT

## 2023-03-02 PROCEDURE — 6370000000 HC RX 637 (ALT 250 FOR IP)

## 2023-03-02 PROCEDURE — 85027 COMPLETE CBC AUTOMATED: CPT

## 2023-03-02 PROCEDURE — 0HQ9XZZ REPAIR PERINEUM SKIN, EXTERNAL APPROACH: ICD-10-PCS | Performed by: OBSTETRICS & GYNECOLOGY

## 2023-03-02 PROCEDURE — 3E033VJ INTRODUCTION OF OTHER HORMONE INTO PERIPHERAL VEIN, PERCUTANEOUS APPROACH: ICD-10-PCS | Performed by: OBSTETRICS & GYNECOLOGY

## 2023-03-02 PROCEDURE — 6360000002 HC RX W HCPCS: Performed by: OBSTETRICS & GYNECOLOGY

## 2023-03-02 PROCEDURE — 2500000003 HC RX 250 WO HCPCS: Performed by: ANESTHESIOLOGY

## 2023-03-02 PROCEDURE — 36415 COLL VENOUS BLD VENIPUNCTURE: CPT

## 2023-03-02 PROCEDURE — 86901 BLOOD TYPING SEROLOGIC RH(D): CPT

## 2023-03-02 PROCEDURE — 1220000001 HC SEMI PRIVATE L&D R&B

## 2023-03-02 PROCEDURE — 3700000025 EPIDURAL BLOCK: Performed by: ANESTHESIOLOGY

## 2023-03-02 PROCEDURE — 7200000001 HC VAGINAL DELIVERY

## 2023-03-02 PROCEDURE — 10907ZC DRAINAGE OF AMNIOTIC FLUID, THERAPEUTIC FROM PRODUCTS OF CONCEPTION, VIA NATURAL OR ARTIFICIAL OPENING: ICD-10-PCS | Performed by: OBSTETRICS & GYNECOLOGY

## 2023-03-02 PROCEDURE — 86850 RBC ANTIBODY SCREEN: CPT

## 2023-03-02 PROCEDURE — 80307 DRUG TEST PRSMV CHEM ANLYZR: CPT

## 2023-03-02 RX ORDER — SODIUM CHLORIDE, SODIUM LACTATE, POTASSIUM CHLORIDE, AND CALCIUM CHLORIDE .6; .31; .03; .02 G/100ML; G/100ML; G/100ML; G/100ML
1000 INJECTION, SOLUTION INTRAVENOUS PRN
Status: DISCONTINUED | OUTPATIENT
Start: 2023-03-02 | End: 2023-03-02

## 2023-03-02 RX ORDER — ONDANSETRON 2 MG/ML
4 INJECTION INTRAMUSCULAR; INTRAVENOUS EVERY 6 HOURS PRN
Status: DISCONTINUED | OUTPATIENT
Start: 2023-03-02 | End: 2023-03-02 | Stop reason: SDUPTHER

## 2023-03-02 RX ORDER — MISOPROSTOL 200 UG/1
800 TABLET ORAL PRN
Status: DISCONTINUED | OUTPATIENT
Start: 2023-03-02 | End: 2023-03-02

## 2023-03-02 RX ORDER — METHYLERGONOVINE MALEATE 0.2 MG/ML
200 INJECTION INTRAVENOUS PRN
Status: DISCONTINUED | OUTPATIENT
Start: 2023-03-02 | End: 2023-03-02

## 2023-03-02 RX ORDER — SODIUM CHLORIDE, SODIUM LACTATE, POTASSIUM CHLORIDE, CALCIUM CHLORIDE 600; 310; 30; 20 MG/100ML; MG/100ML; MG/100ML; MG/100ML
INJECTION, SOLUTION INTRAVENOUS CONTINUOUS
Status: DISCONTINUED | OUTPATIENT
Start: 2023-03-02 | End: 2023-03-02

## 2023-03-02 RX ORDER — IBUPROFEN 600 MG/1
TABLET ORAL
Status: COMPLETED
Start: 2023-03-02 | End: 2023-03-02

## 2023-03-02 RX ORDER — NALOXONE HYDROCHLORIDE 0.4 MG/ML
INJECTION, SOLUTION INTRAMUSCULAR; INTRAVENOUS; SUBCUTANEOUS PRN
Status: DISCONTINUED | OUTPATIENT
Start: 2023-03-02 | End: 2023-03-02

## 2023-03-02 RX ORDER — ACETAMINOPHEN 500 MG
1000 TABLET ORAL EVERY 6 HOURS PRN
Status: DISCONTINUED | OUTPATIENT
Start: 2023-03-02 | End: 2023-03-04 | Stop reason: HOSPADM

## 2023-03-02 RX ORDER — PRENATAL WITH FERROUS FUM AND FOLIC ACID 3080; 920; 120; 400; 22; 1.84; 3; 20; 10; 1; 12; 200; 27; 25; 2 [IU]/1; [IU]/1; MG/1; [IU]/1; MG/1; MG/1; MG/1; MG/1; MG/1; MG/1; UG/1; MG/1; MG/1; MG/1; MG/1
1 TABLET ORAL
Status: DISCONTINUED | OUTPATIENT
Start: 2023-03-03 | End: 2023-03-04 | Stop reason: HOSPADM

## 2023-03-02 RX ORDER — DOCUSATE SODIUM 100 MG/1
100 CAPSULE, LIQUID FILLED ORAL 2 TIMES DAILY
Status: DISCONTINUED | OUTPATIENT
Start: 2023-03-03 | End: 2023-03-04 | Stop reason: HOSPADM

## 2023-03-02 RX ORDER — SODIUM CHLORIDE, SODIUM LACTATE, POTASSIUM CHLORIDE, AND CALCIUM CHLORIDE .6; .31; .03; .02 G/100ML; G/100ML; G/100ML; G/100ML
500 INJECTION, SOLUTION INTRAVENOUS PRN
Status: DISCONTINUED | OUTPATIENT
Start: 2023-03-02 | End: 2023-03-02

## 2023-03-02 RX ORDER — MODIFIED LANOLIN
OINTMENT (GRAM) TOPICAL PRN
Status: DISCONTINUED | OUTPATIENT
Start: 2023-03-02 | End: 2023-03-04 | Stop reason: HOSPADM

## 2023-03-02 RX ORDER — SODIUM CHLORIDE 9 MG/ML
25 INJECTION, SOLUTION INTRAVENOUS PRN
Status: DISCONTINUED | OUTPATIENT
Start: 2023-03-02 | End: 2023-03-02

## 2023-03-02 RX ORDER — CARBOPROST TROMETHAMINE 250 UG/ML
250 INJECTION, SOLUTION INTRAMUSCULAR PRN
Status: DISCONTINUED | OUTPATIENT
Start: 2023-03-02 | End: 2023-03-02

## 2023-03-02 RX ORDER — LEVOTHYROXINE SODIUM 0.03 MG/1
25 TABLET ORAL
Status: DISCONTINUED | OUTPATIENT
Start: 2023-03-03 | End: 2023-03-04 | Stop reason: HOSPADM

## 2023-03-02 RX ORDER — IBUPROFEN 600 MG/1
600 TABLET ORAL EVERY 6 HOURS PRN
Status: DISCONTINUED | OUTPATIENT
Start: 2023-03-02 | End: 2023-03-04 | Stop reason: HOSPADM

## 2023-03-02 RX ORDER — LIDOCAINE HYDROCHLORIDE 10 MG/ML
INJECTION, SOLUTION INFILTRATION; PERINEURAL
Status: DISCONTINUED
Start: 2023-03-02 | End: 2023-03-02 | Stop reason: WASHOUT

## 2023-03-02 RX ORDER — ACETAMINOPHEN 650 MG
TABLET, EXTENDED RELEASE ORAL
Status: COMPLETED
Start: 2023-03-02 | End: 2023-03-02

## 2023-03-02 RX ORDER — ONDANSETRON 2 MG/ML
4 INJECTION INTRAMUSCULAR; INTRAVENOUS EVERY 6 HOURS PRN
Status: DISCONTINUED | OUTPATIENT
Start: 2023-03-02 | End: 2023-03-02

## 2023-03-02 RX ORDER — DOCUSATE SODIUM 100 MG/1
CAPSULE, LIQUID FILLED ORAL
Status: COMPLETED
Start: 2023-03-02 | End: 2023-03-02

## 2023-03-02 RX ADMIN — DOCUSATE SODIUM 100 MG: 100 CAPSULE, LIQUID FILLED ORAL at 23:37

## 2023-03-02 RX ADMIN — Medication 15 ML: at 14:50

## 2023-03-02 RX ADMIN — Medication: at 21:10

## 2023-03-02 RX ADMIN — Medication 87.3 MILLI-UNITS/MIN: at 23:02

## 2023-03-02 RX ADMIN — Medication 166.7 ML: at 21:27

## 2023-03-02 RX ADMIN — IBUPROFEN 600 MG: 600 TABLET ORAL at 23:37

## 2023-03-02 RX ADMIN — IBUPROFEN 600 MG: 600 TABLET, FILM COATED ORAL at 23:37

## 2023-03-02 ASSESSMENT — PAIN DESCRIPTION - DESCRIPTORS: DESCRIPTORS: DISCOMFORT

## 2023-03-02 ASSESSMENT — PAIN SCALES - GENERAL: PAINLEVEL_OUTOF10: 4

## 2023-03-02 ASSESSMENT — PAIN DESCRIPTION - LOCATION: LOCATION: VAGINA

## 2023-03-02 ASSESSMENT — LIFESTYLE VARIABLES: SMOKING_STATUS: 0

## 2023-03-02 NOTE — ANESTHESIA PRE PROCEDURE
Department of Anesthesiology  Preprocedure Note       Name:  Carmelita Barker   Age:  32 y.o.  :  1995                                          MRN:  [id number]         Date:  3/2/2023      Surgeon: * No surgeons listed *    Procedure: * No procedures listed *    Medications prior to admission:   Prior to Admission medications    Medication Sig Start Date End Date Taking? Authorizing Provider   SYNTHROID 25 MCG tablet Take 1 tablet Monday through Saturday, 2 tablets on 23   TERENCE Chavez - CNS   Prenatal MV-Min-Fe Fum-FA-DHA (PRENATAL 1 PO) Take by mouth daily    Historical Provider, MD       Current medications:    No current facility-administered medications for this encounter. Allergies:  No Known Allergies    Problem List:    Patient Active Problem List   Diagnosis Code    Hirsutism L68.0    Thyroid disease E07.9    33 weeks gestation of pregnancy Z3A.33    BMI 40.0-44.9, adult ([facility] [address] Z71.45    Third pregnancy Z34.90    Polyhydramnios in third trimester O40. 3XX0       Past Medical History:        Diagnosis Date    Abnormal Pap smear of cervix     Autoimmune disorder ([facility] [address]     Hashimotos    COVID-19 virus infection 2021    Depression     2nd pregnancy    Diabetes mellitus ([facility] [address]     prev pregnancy    Gestational diabetes mellitus     1st pregnancy    Hypothyroidism     STD (sexually transmitted disease)     HPV    Thyroid disease     hypothyroidism       Past Surgical History:  History reviewed. No pertinent surgical history.     Social History:    Social History     Tobacco Use    Smoking status: Never    Smokeless tobacco: Never   Substance Use Topics    Alcohol use: Not Currently                                Counseling given: Not Answered      Vital Signs (Current):   Vitals:    23 1245   BP: (!) 158/82   Pulse: (!) 117   Weight: 261 lb (118.4 kg)   Height: 5' 1\" (1.549 m)                                              BP Readings from Last 3 Encounters:   03/02/23 (!) 158/82   02/22/23 122/74   02/15/23 120/78       NPO Status:                                                                                 BMI:   Wt Readings from Last 3 Encounters:   03/02/23 261 lb (118.4 kg)   02/22/23 261 lb 6.4 oz (118.6 kg)   02/15/23 260 lb 3.2 oz (118 kg)     Body mass index is 49.32 kg/m². CBC:   Lab Results   Component Value Date/Time    WBC 11.5 02/08/2023 03:00 PM    RBC 4.15 02/08/2023 03:00 PM    HGB 11.8 02/08/2023 03:00 PM    HCT 35.9 02/08/2023 03:00 PM    MCV 86.5 02/08/2023 03:00 PM    RDW 14.9 02/08/2023 03:00 PM     02/08/2023 03:00 PM       CMP:   Lab Results   Component Value Date/Time     05/04/2017 10:10 AM    K 4.3 05/04/2017 10:10 AM    CL 98 05/04/2017 10:10 AM    CO2 20 05/04/2017 10:10 AM    BUN 8 05/04/2017 10:10 AM    CREATININE 0.6 05/04/2017 10:10 AM    GFRAA >60 05/04/2017 10:10 AM    LABGLOM >60 05/04/2017 10:10 AM    GLUCOSE 81 05/04/2017 10:10 AM    PROT 7.6 05/04/2017 10:10 AM    CALCIUM 9.1 05/04/2017 10:10 AM    BILITOT 0.3 05/04/2017 10:10 AM    ALKPHOS 79 05/04/2017 10:10 AM    AST 33 05/04/2017 10:10 AM    ALT 46 05/04/2017 10:10 AM       POC Tests: No results for input(s): POCGLU, POCNA, POCK, POCCL, POCBUN, POCHEMO, POCHCT in the last 72 hours.     Coags: No results found for: PROTIME, INR, APTT    HCG (If Applicable):   Lab Results   Component Value Date    PREGTESTUR negative 05/20/2022        ABGs: No results found for: PHART, PO2ART, OUR0QTV, QEL6SWJ, BEART, F8JLHSOM     Type & Screen (If Applicable):  No results found for: LABABO, LABRH    Drug/Infectious Status (If Applicable):  No results found for: HIV, HEPCAB    COVID-19 Screening (If Applicable): No results found for: COVID19        Anesthesia Evaluation  Patient summary reviewed and Nursing notes reviewed no history of anesthetic complications:   Airway: Mallampati: II  TM distance: >3 FB   Neck ROM: full  Mouth opening: > = 3 FB   Dental: Comment: Nasal piercing. Pulmonary:Negative Pulmonary ROS and normal exam  breath sounds clear to auscultation      (-) not a current smoker          Patient did not smoke on day of surgery. Cardiovascular:Negative CV ROS  Exercise tolerance: good (>4 METS),           Rhythm: regular  Rate: normal           Beta Blocker:  Not on Beta Blocker         Neuro/Psych:   (+) psychiatric history: stable without treatmentdepression/anxiety             GI/Hepatic/Renal:   (+) morbid obesity          Endo/Other:    (+) hypothyroidism::., .    (-) diabetes mellitus                ROS comment: Hx of Hashimotos disease. Abdominal:             Vascular: Other Findings:           Anesthesia Plan      general, spinal and epidural     ASA 2     (Discussed labor options with patient and spouse. Questions answered. Patient agrees to epidural when needed.)        Anesthetic plan and risks discussed with patient and spouse. Use of blood products discussed with patient whom consented to blood products. Plan discussed with attending.                   CBC   Lab Results   Component Value Date/Time    WBC 11.5 02/08/2023 03:00 PM    RBC 4.15 02/08/2023 03:00 PM    HGB 11.8 02/08/2023 03:00 PM    HCT 35.9 02/08/2023 03:00 PM    MCV 86.5 02/08/2023 03:00 PM    RDW 14.9 02/08/2023 03:00 PM     02/08/2023 03:00 PM     CMP    Lab Results   Component Value Date/Time     05/04/2017 10:10 AM    K 4.3 05/04/2017 10:10 AM    CL 98 05/04/2017 10:10 AM    CO2 20 05/04/2017 10:10 AM    BUN 8 05/04/2017 10:10 AM    CREATININE 0.6 05/04/2017 10:10 AM    GFRAA >60 05/04/2017 10:10 AM    LABGLOM >60 05/04/2017 10:10 AM    GLUCOSE 81 05/04/2017 10:10 AM    PROT 7.6 05/04/2017 10:10 AM    CALCIUM 9.1 05/04/2017 10:10 AM    BILITOT 0.3 05/04/2017 10:10 AM    ALKPHOS 79 05/04/2017 10:10 AM    AST 33 05/04/2017 10:10 AM    ALT 46 05/04/2017 10:10 AM     BMP    Lab Results   Component Value Date/Time     05/04/2017 10:10 AM    K 4.3 05/04/2017 10:10 AM    CL 98 05/04/2017 10:10 AM    CO2 20 05/04/2017 10:10 AM    BUN 8 05/04/2017 10:10 AM    CREATININE 0.6 05/04/2017 10:10 AM    CALCIUM 9.1 05/04/2017 10:10 AM    GFRAA >60 05/04/2017 10:10 AM    LABGLOM >60 05/04/2017 10:10 AM    GLUCOSE 81 05/04/2017 10:10 AM     POCGlucose  No results for input(s): GLUCOSE in the last 72 hours. Coags  No results found for: PROTIME, INR, APTT    HCG (If Applicable)   Lab Results   Component Value Date    PREGTESTUR negative 05/20/2022        ABGs   No results found for: PHART, PO2ART, XLD6JGI, BBP1CSX, BEART, J5MYNNST     Type & Screen (If Applicable)  Lab Results   Component Value Date    ABORH O POS 12/16/2022     Active Problem List with ICD10 Codes  Patient Active Problem List   Diagnosis Code    Hirsutism L68.0    Thyroid disease E07.9    33 weeks gestation of pregnancy Z3A.33    [facility] 40.0-44.9, [facility] Z68.41    Third pregnancy Z34.90    Polyhydramnios in third trimester O40. 3XX0       TERENCE Sims CRNA  March 2, 2023  1:23 PM    TERENCE Sims CRNA   3/2/2023

## 2023-03-02 NOTE — ANESTHESIA PROCEDURE NOTES
Epidural Block    Patient location during procedure: OB  Start time: 3/2/2023 2:40 PM  End time: 3/2/2023 2:45 PM  Reason for block: labor epidural  Staffing  Performed: resident/CRNA   Anesthesiologist: Nancy Monteiro DO  Resident/CRNA: TERENCE Ramirez CRNA  Epidural  Patient position: sitting  Prep: ChloraPrep  Patient monitoring: cardiac monitor, continuous pulse ox and frequent blood pressure checks  Approach: midline  Location: L3-4  Injection technique: VINCE saline  Provider prep: mask and sterile gloves  Needle  Needle type: Tuohy   Needle gauge: 18 G  Needle length: 3.5 in  Needle insertion depth: 8 cm  Catheter type: end hole  Catheter size: 20 G.   Catheter at skin depth: 15 cm  Test dose: negativeCatheter Secured: tegaderm and tape  Assessment  Hemodynamics: stable  Attempts: 1  Outcomes: uncomplicated and patient tolerated procedure well  Preanesthetic Checklist  Completed: patient identified, IV checked, site marked, risks and benefits discussed, surgical/procedural consents, equipment checked, pre-op evaluation, timeout performed, anesthesia consent given, oxygen available and monitors applied/VS acknowledged

## 2023-03-02 NOTE — H&P
Department of Obstetrics and Gynecology  Labor and Delivery  History & Physical    Patient:  Monica Lynch     Admit Date:  3/2/2023 12:14 PM  Medical Record Number:  [id number]    CHIEF COMPLAINT:  IUP at 44 2/7 weeks with polyhydramnios, excessive fetal growth and hypothyroidism for IOL 4cm dilated with a BS=8. PROBLEM LIST:     Patient Active Problem List   Diagnosis    Thyroid disease    BMI 40.0-44.9, adult (pregravid BMI 45.56    Third pregnancy    Polyhydramnios in third trimester (29.97at 39w1d)     39 weeks gestation of pregnancy    Obesity complicating pregnancy in third trimester    Excessive fetal growth affecting management of mother in payne pregnancy in third trimester, antepartum (7#15oz 85.2% at [address] [place] [address] [address])    History of gestational diabetes in prior pregnancy, currently pregnant in third trimester    Hypothyroidism due to Hashimoto's thyroiditis    Hypothyroidism complicating pregnancy in third trimester          HISTORY OF PRESENT ILLNESS:    The patient is a 32 y.o. W female [id number] at 44w2d. Patient presents with a pregnancy complicated by polyhydramnios, excessive fetal growth, obesity and hypothyroidism and is being admitted for IOL with Pitocin and amniotomy (BS=8 and 4cm dilated) at 39w as recommended per M Dr. Delta Randall (23 note). On presentation she has an occasional contraction but denies LOF and VB. She has no sx UTI or PIH.  There has been good FM    ESTIMATED DUE DATE: Estimated Date of Delivery: 3/7/23    PRENATAL CARE:  Complicated by: see HPI and problem list  GBS: negative    Past OB History  OB History          3    Para   2    Term   2            AB        Living   2         SAB        IAB        Ectopic        Molar        Multiple   0    Live Births   2                Past Medical History:        Diagnosis Date    Abnormal Pap smear of cervix     Autoimmune disorder ([facility] [address]     Hashimotos    COVID-19 virus infection 2021    Depression     2nd pregnancy    Diabetes mellitus ([facility] [address]     prev pregnancy    Gestational diabetes mellitus     1st pregnancy    Hypothyroidism     STD (sexually transmitted disease)     HPV    Thyroid disease     hypothyroidism       Past Surgical History:    History reviewed. No pertinent surgical history. Allergies:  Patient has no known allergies. Social History:    Social History     Socioeconomic History    Marital status:      Spouse name: Not on file    Number of children: Not on file    Years of education: Not on file    Highest education level: Not on file   Occupational History    Not on file   Tobacco Use    Smoking status: Never    Smokeless tobacco: Never   Vaping Use    Vaping Use: Never used   Substance and Sexual Activity    Alcohol use: Not Currently    Drug use: Not Currently    Sexual activity: Yes     Partners: Male   Other Topics Concern    Not on file   Social History Narrative    Not on file     Social Determinants of Health     Financial Resource Strain: Not on file   Food Insecurity: Not on file   Transportation Needs: Not on file   Physical Activity: Not on file   Stress: Not on file   Social Connections: Not on file   Intimate Partner Violence: Not on file   Housing Stability: Not on file       Family History:   History reviewed. No pertinent family history.     Medications Prior to Admission:  Medications Prior to Admission: SYNTHROID 25 MCG tablet, Take 1 tablet Monday through Saturday, 2 tablets on Sunday  Prenatal MV-Min-Fe Fum-FA-DHA (PRENATAL 1 PO), Take by mouth daily    REVIEW OF SYSTEMS:  CONSTITUTIONAL:  negative  RESPIRATORY:  negative  CARDIOVASCULAR:  negative  GASTROINTESTINAL:  negative  ALLERGIC/IMMUNOLOGIC:  negative  NEUROLOGICAL:  negative  BEHAVIOR/PSYCH:  negative    PHYSICAL EXAM:  Vitals:    03/02/23 1245   BP: (!) 158/82   Pulse: (!) 117   Weight: 261 lb (118.4 kg)   Height: 5' 1\" (1.549 m)     General appearance:  awake, alert, cooperative, no apparent distress, and appears stated age  Neurologic:  Awake, alert, oriented to name, place and time. Skin: warm, dry, normal color, no rashes  Head:  NC,AT,NT  Eyes:  Sclerae white, pupils equal and reactive, red reflex normal bilaterally  Ears:  Well-positioned, well-formed pinnae; Hearing: intact  Nose:  Clear, normal mucosa  Throat:  Lips, tongue and mucosa are pink, moist and intact; no exudate  Neck:  Supple, symmetrical  Heart:  Regular rate & rhythm, S1 S2, no murmurs, rubs, or gallops  Lungs:  No increased work of breathing, good air exchange, CTA b/l. Abdomen:  Soft, non tender, gravid, consistent with her gestational age, EFW by Rama's manouever was (#  Extremities: No clubbing, cyanosis, cords; No calf tenderness; Edema: no  Pulses:  Strong equal distal pulses, brisk capillary refill  Fetal heart rate:  Reassuring. Pelvis:  Adequate pelvis  Cervix: 4 cm / 70% / soft / -2 / mid, Juarez Score: 8 (yesterday office check)  Contraction frequency:  occasional  Membranes:  Intact    Labs:  No results found for this or any previous visit (from the past 72 hour(s)). ASSESSMENT:  32 y.o. W female at 39w2d [id number]  For pitocin IOL and Amniotomy (4cm with BS=8) as per MFM.   Polyhydramnios  Excessive fetal growth  Pregravid BMI 45.56  H/O GDM prior pregnamcy  Hypothyroidism due to Hashimoto's  Patient Active Problem List   Diagnosis    Thyroid disease    BMI 40.0-44.9, adult (pregravid BMI 45.56    Third pregnancy    Polyhydramnios in third trimester (29.97at 39w1d)     39 weeks gestation of pregnancy    Obesity complicating pregnancy in third trimester    Excessive fetal growth affecting management of mother in payne pregnancy in third trimester, antepartum (7#15oz 85.2% at [facility] [address])    History of gestational diabetes in prior pregnancy, currently pregnant in third trimester    Hypothyroidism due to Hashimoto's thyroiditis    Hypothyroidism complicating pregnancy in third trimester     Fetus: Reassuring  GBS: negative    PLAN:  Orders Placed This Encounter   Procedures    CBC    Urine Drug Screen    ADULT DIET; Clear Liquid    Vital signs per unit routine    Notify physician for abnormal lab results, non-reassuring fetal heart tracing, nonprogressive labor, impending delivery. Up with assistance    Position change    Ice pack to perineum    Continuous external fetal heart rate monitoring    External uterine contraction monitoring    I/O per unit routine    Verify informed consent    Verify history and physical completed    Full Code    Nonrebreather mask oxygen    TYPE AND SCREEN    Admit to L&D      Current Facility-Administered Medications   Medication Dose Route Frequency Provider Last Rate Last Admin    lactated ringers IV soln infusion   IntraVENous Continuous Carlton Prince MD        0.9 % sodium chloride infusion  25 mL IntraVENous PRN Carlton Prince MD        ondansetron [facility]) injection 4 mg  4 mg IntraVENous Q6H PRN Carlton Prince MD        povidone-iodine (BETADINE) 10 % external solution               Admit - amniotomy and Pitocin. May have epidural at request.    Carlton Prince MD, M.D.  [facility]  3/2/2023 2:01 PM

## 2023-03-02 NOTE — PROGRESS NOTES
Dr. Velia Marina called updated pt is here, and states he will come and rupture pt when pt is fully admitted.

## 2023-03-02 NOTE — PROGRESS NOTES
L&D PROGRESS NOTE:    Patient:  Collette Treadwell     Admit Date:  3/2/2023 12:14 PM  Medical Record Number:  [id number]   Today's Date: 3/2/2023    S: Dr Vitaliy Navarrete MD requesting AROM. O:   Vitals:    23 1245   BP: (!) 158/82   Pulse: (!) 117   Weight: 261 lb (118.4 kg)   Height: 5' 1\" (1.549 m)     FHR:  Reassuring. Cervix: 4 cm / 80% / soft / -1 / mid. TOCO:  occasional    A:27 y.o. W female at 44w2d   For pitocin IOL and Amniotomy (4cm with BS=8) as per MFM  Polyhydramnios  Excessive fetal growth  Pregravid BMI 45.56  H/O GDM prior pregnamcy  Hypothyroidism due to Hashimoto's  Patient Active Problem List   Diagnosis    Thyroid disease    BMI 40.0-44.9, adult (pregravid BMI 45.56    Third pregnancy    Polyhydramnios in third trimester (29.97at 39w1d)     39 weeks gestation of pregnancy    Obesity complicating pregnancy in third trimester    Excessive fetal growth affecting management of mother in payne pregnancy in third trimester, antepartum (7#15oz 85.2% at [address] [place] [address])    History of gestational diabetes in prior pregnancy, currently pregnant in third trimester    Hypothyroidism due to Hashimoto's thyroiditis    Hypothyroidism complicating pregnancy in third trimester     Fetal status: Reassuring  GBS: negative    P: AROM at 1357 without difficulty using the [place]. Ruptured clear fluid. IV bolus/O2/position changes prn  Epidural at request.  See orders per Dr. Vitaliy Navarrete MD.    Vitaliy Navarrete MD, M.D.  FACOG  3/2/2023 2:00 PM

## 2023-03-03 PROCEDURE — 1220000000 HC SEMI PRIVATE OB R&B

## 2023-03-03 PROCEDURE — 6370000000 HC RX 637 (ALT 250 FOR IP): Performed by: OBSTETRICS & GYNECOLOGY

## 2023-03-03 RX ADMIN — Medication: at 22:09

## 2023-03-03 RX ADMIN — IBUPROFEN 600 MG: 600 TABLET ORAL at 05:23

## 2023-03-03 RX ADMIN — ACETAMINOPHEN 1000 MG: 500 TABLET ORAL at 09:06

## 2023-03-03 RX ADMIN — ACETAMINOPHEN 1000 MG: 500 TABLET ORAL at 20:03

## 2023-03-03 RX ADMIN — METFORMIN HYDROCHLORIDE 1 TABLET: 500 TABLET, EXTENDED RELEASE ORAL at 09:06

## 2023-03-03 RX ADMIN — DOCUSATE SODIUM 100 MG: 100 CAPSULE, LIQUID FILLED ORAL at 20:03

## 2023-03-03 RX ADMIN — DOCUSATE SODIUM 100 MG: 100 CAPSULE, LIQUID FILLED ORAL at 09:06

## 2023-03-03 RX ADMIN — IBUPROFEN 600 MG: 600 TABLET ORAL at 19:25

## 2023-03-03 ASSESSMENT — PAIN SCALES - GENERAL
PAINLEVEL_OUTOF10: 1
PAINLEVEL_OUTOF10: 2
PAINLEVEL_OUTOF10: 3

## 2023-03-03 ASSESSMENT — PAIN - FUNCTIONAL ASSESSMENT
PAIN_FUNCTIONAL_ASSESSMENT: ACTIVITIES ARE NOT PREVENTED
PAIN_FUNCTIONAL_ASSESSMENT: ACTIVITIES ARE NOT PREVENTED

## 2023-03-03 ASSESSMENT — PAIN DESCRIPTION - ORIENTATION
ORIENTATION: LOWER
ORIENTATION: LOWER

## 2023-03-03 ASSESSMENT — PAIN DESCRIPTION - LOCATION
LOCATION: ABDOMEN

## 2023-03-03 ASSESSMENT — PAIN DESCRIPTION - DESCRIPTORS
DESCRIPTORS: ACHING;CRAMPING
DESCRIPTORS: CRAMPING

## 2023-03-03 NOTE — LACTATION NOTE
Experienced mom-breast fed 2 other children-longest x 18 mos. Had low milk supply issues w/ 2nd child. Was also Dx w/ hypothyroidism and Hashimoto's w/ 2nd child-discussed how this can affect milk supply and to follow up w/ HCP to monitor thyroid levels. Pt reports breastfeeding is going well with this baby and latch is comfortable. Declined need for lactation assistance at this time. Instructed on normal infant behavior in the first 12-24 hrs, benefits of skin to skin and components of safe positioning, encouraged rooming-in and avoidance of pacifier use until breastfeeding is well established. Reviewed latch techniques, positioning, signs of effective milk transfer, waking techniques and the importance of frequent feedings- 8-12 times/ 24 hrs to stimulate/maintain milk production. Knows hand expression and encouraged to express drops of colostrum at start of feeding. Reviewed feeding cues and expected urine/stool output and transition. Encouraged to feed infant as often and for as long as the infant wishes to do so. Reviewed Guide to Breastfeeding book. Offered support and encouraged to call for assistance or concerns. Does NOT want electric breast pump for home.

## 2023-03-03 NOTE — PROGRESS NOTES
Updated Dr Ivanna Hernandez on patient. Notified fundal exams have been firm at [facility]. Last check a few small clots came out. Overall bleeding has been moderate. Vital signs have been stable. Patient asymptomatic. Orders to continue to monitor and to give another bag of pitocin when this one finishes.

## 2023-03-03 NOTE — PROGRESS NOTES
Updated Dr Betty Troncoso, patient sve complete/0 station. Patient feeling some pressure. Notified patient in high fowlers. Orders to let patient labor down, call when ready.

## 2023-03-03 NOTE — PROGRESS NOTES
[facility] screening results were discussed with parent. Questions answered. Brochure given to parent. Advised to monitor developmental milestones and contact physician for any concerns.    Carlos Kendall

## 2023-03-03 NOTE — PROGRESS NOTES
Notified Dr Shabbir Shafer patient bleeding small amount. Fundal check firm at [facility].

## 2023-03-03 NOTE — PROGRESS NOTES
of viable baby girl at . Delayed cord clamping completed. Skin to skin initiated immediately after delivery. APGARS 8/9.

## 2023-03-03 NOTE — ANESTHESIA POSTPROCEDURE EVALUATION
Department of Anesthesiology  Postprocedure Note    Patient: Carmelita Barker  MRN: [id number]  YOB: 1995  Date of evaluation: 3/3/2023      Procedure Summary     Date: 03/02/23 Room / Location:     Anesthesia Start: 1435 Anesthesia Stop: 2118    Procedure: Labor Analgesia Diagnosis:     Scheduled Providers:  Responsible Provider: Michelle Foreman DO    Anesthesia Type: general, spinal, epidural ASA Status: 2          Anesthesia Type: No value filed.     Magdaleno Phase I:      Magdaleno Phase II:        Anesthesia Post Evaluation    Patient location during evaluation: bedside  Patient participation: complete - patient participated  Level of consciousness: awake and alert  Pain score: 0  Airway patency: patent  Nausea & Vomiting: no nausea and no vomiting  Complications: no  Cardiovascular status: blood pressure returned to baseline and hemodynamically stable  Respiratory status: acceptable  Hydration status: stable

## 2023-03-03 NOTE — PROGRESS NOTES
PPD #1    Patient:  Ibrahima Styles     Admit Date:  3/2/2023 12:14 PM  Medical Record Number:  [id number]   Today's Date: 3/3/2023    S: No complaints, doing well; tolerating diet: yes -general; ambulating well: yes -up in room; voiding without difficulty:  yes -has no urinary complaints; bm: denies; flatus: yes -normal; pain meds appropriate: yes -Tylenol and ibuprofen; vaginal bleeding: Less than a period.     O:   Vitals:    03/02/23 2331 03/02/23 2346 03/03/23 0019 03/03/23 0748   BP: 130/76 119/72 135/70 124/70   Pulse: (!) 105 (!) 109 98 99   Resp: 16 16 16 16   Temp:   98.3 °F (36.8 °C) 97.7 °F (36.5 °C)   TempSrc:   Oral Oral   SpO2:   99% 99%   Weight:       Height:         Gen: A&O, cooperative, pleasant   Heart: RRR   Lungs: CTA b/l   Abd; soft, NT, non distended, +BS  Back: no CVA or paraspinal tenderness   Ext: No clubbing, cyanosis, edema:no , no cords palpable, no calf tenderness   Neuro: intact   Uterus: firm, well contracted, nt   Perineum: intact, healing well   Maggie pad: staining only, thin lochia    Lab Results   Component Value Date    HGB 11.9 03/02/2023    HCT 36.1 03/02/2023      Recent Results (from the past 72 hour(s))   CBC    Collection Time: 03/02/23  1:35 PM   Result Value Ref Range    WBC 10.2 4.5 - 11.5 E9/L    RBC 4.09 3.50 - 5.50 E12/L    Hemoglobin 11.9 11.5 - 15.5 g/dL    Hematocrit 36.1 34.0 - 48.0 %    MCV 88.3 80.0 - 99.9 fL    MCH 29.1 26.0 - 35.0 pg    MCHC 33.0 32.0 - 34.5 %    RDW 14.9 11.5 - 15.0 fL    Platelets 476 993 - 565 E9/L    MPV 12.9 (H) 7.0 - 12.0 fL   TYPE AND SCREEN    Collection Time: 03/02/23  1:35 PM   Result Value Ref Range    ABO/Rh O POS     Antibody Screen NEG    Urine Drug Screen    Collection Time: 03/02/23  1:52 PM   Result Value Ref Range    Amphetamine Screen, Urine NOT DETECTED Negative <1000 ng/mL    Barbiturate Screen, Ur NOT DETECTED Negative < 200 ng/mL    Benzodiazepine Screen, Urine NOT DETECTED Negative < 200 ng/mL    Cannabinoid Scrn, Ur NOT DETECTED Negative < 50ng/mL    Cocaine Metabolite Screen, Urine NOT DETECTED Negative < 300 ng/mL    Opiate Scrn, Ur NOT DETECTED Negative < 300ng/mL    PCP Screen, Urine NOT DETECTED Negative < 25 ng/mL    Methadone Screen, Urine NOT DETECTED Negative <300 ng/mL    Oxycodone Urine NOT DETECTED Negative <100 ng/mL    FENTANYL SCREEN, URINE NOT DETECTED Negative <1 ng/mL    Drug Screen Comment: see below        A: 32 y.o. female  at 39w2d weeks  PPD #1 S/P ; Episiotomy was not needed due to a spontaneous 1 cm 1st degree (mucosa only) vaginal laceration  Pregravid BMI 45.56  H/O GDM prior pregnancy  Hypothyroidism due to Hashimoto's  Patient Active Problem List   Diagnosis    Thyroid disease    BMI 40.0-44.9, adult (pregravid BMI 45.56    Third pregnancy    Polyhydramnios in third trimester (29.97at 39w1d)     39 weeks gestation of pregnancy    Obesity complicating pregnancy in third trimester    Excessive fetal growth affecting management of mother in payne pregnancy in third trimester, antepartum (7#15oz 85.2% at [address] [place] [address])    History of gestational diabetes in prior pregnancy, currently pregnant in third trimester    Hypothyroidism due to Hashimoto's thyroiditis    Hypothyroidism complicating pregnancy in third trimester       P: Routine PP care. Resume prenatal vitamins with iron daily. Resume Synthroid 25 mcg daily. Continue ibuprofen and Tylenol as needed for pain. Home tomorrow is anticipated.     Jayne Rubio MD FACOG  3/3/2023 10:36 AM

## 2023-03-03 NOTE — PROGRESS NOTES
Assumed patient care at [address] [place] [address]. Pitocin currently running at 6 suzette-units.

## 2023-03-03 NOTE — L&D DELIVERY NOTE
Department of Obstetrics and Gynecology  Spontaneous Vaginal Delivery Note    Patient:  Kailey Chicas     Admit Date:  3/2/2023 12:14 PM  Medical Record Number:  [id number]   Procedure Date: 3/2/2023 11:16 PM     Pre-delivery Diagnosis:  {dx pre del:92016}    Post-delivery Diagnosis:  {dx post del:28251}    Information for the patient's :  Felicia Khan Girl Alexkay Ramirez [[id number]]                  Infant Wt:   Information for the patient's :  Felicai Khan Girl Alex Ramirez [[id number]]         APGARS:     Information for the patient's :  [address] [[id number]]        Anesthesia:  {RDPYLENKDF:906900010}    Application and Delivery:  32 y.o. *** female [id number] at 39w2d admitted for {L&D ADM INDICATIONS:951916885} who progressed to complete and delivered {FETAL PRESENTATION:927329703} presentation via  @ ***, under {ANESTHESIA:761953974} anesthesia,  over an intact perineum***episiotomy*** {WITH-WITHOUT:} a resulting {Laceration:70619} laceration, without dystocia or complication, a {LIVE BORN/STILL HEIK:166062334} {GENDER:493445293} infant, weighing {NUMBERS 0-12:21513}# {NUMBERS 1-15:49668}oz, *** grams, {desc; amniotic fluid:05327} fluid at delivery, bulb suctioned on perineum. A nuchal cord {PROC PREC DELIVERY NUCHAL CORD:}. A delayed cord clamping was performed (was not performed due to the acuity of the infant/mother's condition)***. Spontaneous cry,  Apgar's 1 minute:  {NUMBERS 0-10:36206}; 5 minute:  {NUMBERS 0-10:82707}. The placenta was delivered with gentle traction and was noted to be {PLACENTA APPEARANCE:}. Episiotomy {PROC PREC DELIVERY EPISIOTOMY:}. Repair ***not necessary. ***performed in layers, per routine with  {TYPE:695837453} suture. Cervix, rectum, sphincter intact. Sponge, needle, and instrument counts correct x 2.     Delivery Summary:  Labor & Delivery Summary  Dilation Complete Date: 23  Dilation Complete Time: 2050    Specimen:  Placenta sent to pathology ***     Estimated blood loss:         Condition:  {CONDITION:085551594}    Blood Type and Rh: O POS        Rubella Immunity Status:   {immunity yes no:36660}           Infant Feeding:    {breast/bottle:69}    Attending Attestation: {attestation:12874}    Meng Lara MD MD, FACOG  3/2/2023 11:16 PM

## 2023-03-03 NOTE — PROGRESS NOTES
Patient up to bathroom with assistance. Patient voided. Maggie care completed. Patient tolerated well. Transferred to wheelchair to go to mom/baby unit.

## 2023-03-04 VITALS
OXYGEN SATURATION: 97 % | HEIGHT: 61 IN | SYSTOLIC BLOOD PRESSURE: 130 MMHG | BODY MASS INDEX: 49.28 KG/M2 | RESPIRATION RATE: 18 BRPM | HEART RATE: 90 BPM | WEIGHT: 261 LBS | DIASTOLIC BLOOD PRESSURE: 63 MMHG | TEMPERATURE: 98.6 F

## 2023-03-04 PROCEDURE — 6370000000 HC RX 637 (ALT 250 FOR IP): Performed by: OBSTETRICS & GYNECOLOGY

## 2023-03-04 RX ORDER — IBUPROFEN 600 MG/1
600 TABLET ORAL EVERY 6 HOURS PRN
Qty: 60 TABLET | Refills: 1 | Status: SHIPPED | OUTPATIENT
Start: 2023-03-04

## 2023-03-04 RX ORDER — ACETAMINOPHEN 500 MG
1000 TABLET ORAL EVERY 6 HOURS PRN
Refills: 0 | COMMUNITY
Start: 2023-03-04

## 2023-03-04 RX ORDER — PSEUDOEPHEDRINE HCL 30 MG
100 TABLET ORAL 2 TIMES DAILY PRN
COMMUNITY
Start: 2023-03-04

## 2023-03-04 RX ORDER — MODIFIED LANOLIN
1 OINTMENT (GRAM) TOPICAL PRN
COMMUNITY
Start: 2023-03-04

## 2023-03-04 RX ADMIN — METFORMIN HYDROCHLORIDE 1 TABLET: 500 TABLET, EXTENDED RELEASE ORAL at 09:30

## 2023-03-04 RX ADMIN — LEVOTHYROXINE SODIUM 25 MCG: 25 TABLET ORAL at 06:27

## 2023-03-04 RX ADMIN — IBUPROFEN 600 MG: 600 TABLET ORAL at 02:25

## 2023-03-04 RX ADMIN — IBUPROFEN 600 MG: 600 TABLET ORAL at 14:37

## 2023-03-04 RX ADMIN — DOCUSATE SODIUM 100 MG: 100 CAPSULE, LIQUID FILLED ORAL at 09:30

## 2023-03-04 RX ADMIN — ACETAMINOPHEN 1000 MG: 500 TABLET ORAL at 06:27

## 2023-03-04 ASSESSMENT — PAIN - FUNCTIONAL ASSESSMENT
PAIN_FUNCTIONAL_ASSESSMENT: ACTIVITIES ARE NOT PREVENTED

## 2023-03-04 ASSESSMENT — PAIN DESCRIPTION - DESCRIPTORS
DESCRIPTORS: CRAMPING
DESCRIPTORS: CRAMPING
DESCRIPTORS: SORE

## 2023-03-04 ASSESSMENT — PAIN DESCRIPTION - LOCATION
LOCATION: ABDOMEN

## 2023-03-04 ASSESSMENT — PAIN SCALES - GENERAL
PAINLEVEL_OUTOF10: 4
PAINLEVEL_OUTOF10: 1
PAINLEVEL_OUTOF10: 1

## 2023-03-04 ASSESSMENT — PAIN DESCRIPTION - ORIENTATION
ORIENTATION: LOWER
ORIENTATION: LOWER

## 2023-03-04 NOTE — DISCHARGE INSTR - DIET

## 2023-03-04 NOTE — DISCHARGE SUMMARY
Department of Obstetrics and Gynecology  Labor and Delivery  Discharge Summary    Patient:  Hipolito Knott         Medical Record Number:  [id number]    Admit Date:  3/2/2023 12:14 PM    Discharge Date: 3/4/2023    Final Diagnosis:   Principal Problem:    39 weeks gestation of pregnancy  Active Problems:    BMI 40.0-44.9, adult (pregravid BMI 45.56    Third pregnancy    Polyhydramnios in third trimester (29.97at 39w1d)     Obesity complicating pregnancy in third trimester    Excessive fetal growth affecting management of mother in payne pregnancy in third trimester, antepartum (7#15oz 85.2% at [address] [place] [address])    History of gestational diabetes in prior pregnancy, currently pregnant in third trimester    Hypothyroidism complicating pregnancy in third trimester    Hypothyroidism due to Hashimoto's thyroiditis  Resolved Problems:    * No resolved hospital problems. *    Chief Complaint - Presenting Illness - Physical Findings:   39 weeks gestation of pregnancy [Z3A.39]  HPI: ***    Hospital Course:   Delivery Summary:  ***    The patient had an unremarkable Hospital Course. Her care was advanced per routine protocol. Her vital signs were stable, she remained afebrile, and her physical exam was unremarkable throughout her hospitalization. She was subsequently discharged home on the second Hospital Day as she was tolerating her diet, ambulating well, voiding without difficulty and had appropriate flatus with a bowel movement.     Recent Results (from the past 72 hour(s))   CBC    Collection Time: 03/02/23  1:35 PM   Result Value Ref Range    WBC 10.2 4.5 - 11.5 E9/L    RBC 4.09 3.50 - 5.50 E12/L    Hemoglobin 11.9 11.5 - 15.5 g/dL    Hematocrit 36.1 34.0 - 48.0 %    MCV 88.3 80.0 - 99.9 fL    MCH 29.1 26.0 - 35.0 pg    MCHC 33.0 32.0 - 34.5 %    RDW 14.9 11.5 - 15.0 fL    Platelets 106 916 - 250 E9/L    MPV 12.9 (H) 7.0 - 12.0 fL   TYPE AND SCREEN    Collection Time: 03/02/23  1:35 PM   Result Value Ref Range    ABO/Rh O POS     Antibody Screen NEG    Urine Drug Screen    Collection Time: 03/02/23  1:52 PM   Result Value Ref Range    Amphetamine Screen, Urine NOT DETECTED Negative <1000 ng/mL    Barbiturate Screen, Ur NOT DETECTED Negative < 200 ng/mL    Benzodiazepine Screen, Urine NOT DETECTED Negative < 200 ng/mL    Cannabinoid Scrn, Ur NOT DETECTED Negative < 50ng/mL    Cocaine Metabolite Screen, Urine NOT DETECTED Negative < 300 ng/mL    Opiate Scrn, Ur NOT DETECTED Negative < 300ng/mL    PCP Screen, Urine NOT DETECTED Negative < 25 ng/mL    Methadone Screen, Urine NOT DETECTED Negative <300 ng/mL    Oxycodone Urine NOT DETECTED Negative <100 ng/mL    FENTANYL SCREEN, URINE NOT DETECTED Negative <1 ng/mL    Drug Screen Comment: see below      Physicians Following Patient During Hospitalization - Reason For Care:  Admitting Physician: Laura Traore  Delivering Physician: Dustin Chen Physician(s):    PP#1 Pao   PP#2 Laura Traore  Discharging Physician: Laura Traore  Consultant(s):  n/a    Discharge Condition:  Level of Function:  Stable  Caregiver Arrangements and Educational Efforts: Written Discharge Instructions were verbally reviewed and given to the Patient. Special Problems: None    Plans For Continuing Care:  Unreported Test Results and Intended Follow-Up: 6 week(s) time. Instructions for Physical Activity: No driving for 1 week(s). No sex or tampon use for 6 weeks. No douching. No heavy lifting (greater than baby and carrier) for 6 weeks. Frequent ambulation with stairs - start slowly then increase as tolerated. Return to work in 6 weeks. Showers are fine - avoid bath tubs, hot tubs, swimming. Instructions for Diet: Regular diet  Discharge Medications:  Current Discharge Medication List        START taking these medications    Details   benzocaine-menthol (DERMOPLAST) 20-0.5 % AERO spray Apply topically as needed for Pain . May use hospital sample at home as needed.   Refills: 0      docusate sodium (COLACE, DULCOLAX) 100 MG CAPS Take 100 mg by mouth 2 times daily as needed for Constipation      ibuprofen (ADVIL;MOTRIN) 600 MG tablet Take 1 tablet by mouth every 6 hours as needed (Pain 1-10)  Qty: 60 tablet, Refills: 1      lansinoh lanolin CREA ointment Apply 1 application topically as needed for Dry Skin (nipple discomfort)      witch hazel-glycerin (TUCKS) pad Place rectally as needed. Refills: 0      acetaminophen (TYLENOL) 500 MG tablet Take 2 tablets by mouth every 6 hours as needed (Pain 1-10)  Refills: 0           CONTINUE these medications which have NOT CHANGED    Details   SYNTHROID 25 MCG tablet Take 1 tablet Monday through Saturday, 2 tablets on Sunday  Qty: 34 tablet, Refills: 3    Associated Diagnoses: Acquired hypothyroidism      Prenatal MV-Min-Fe Fum-FA-DHA (PRENATAL 1 PO) Take by mouth daily           Follow-Up Visit Plan: In 6 weeks for final postpartum visit. Disposition: Home. Time Spent on Discharge:  30 minutes were spent in patient examination, evaluation, counseling as well as medication reconciliation, prescriptions for required medications, discharge plan and follow up.       Andry Samaniego MD MD,FACOG    3/4/2023 1:49 PM

## 2023-03-04 NOTE — PLAN OF CARE
Problem: Postpartum  Goal: Experiences normal postpartum course  Description:  Postpartum OB-Pregnancy care plan goal which identifies if the mother is experiencing a normal postpartum course  Outcome: Progressing     Problem: Postpartum  Goal: Appropriate maternal -  bonding  Description:  Postpartum OB-Pregnancy care plan goal which identifies if the mother and  are bonding appropriately  Outcome: Progressing     Problem: Postpartum  Goal: Establishment of infant feeding pattern  Description:  Postpartum OB-Pregnancy care plan goal which identifies if the mother is establishing a feeding pattern with their   Outcome: Progressing     Problem: Pain  Goal: Verbalizes/displays adequate comfort level or baseline comfort level  Outcome: Progressing  Flowsheets (Taken 3/4/2023 0000)  Verbalizes/displays adequate comfort level or baseline comfort level:   Encourage patient to monitor pain and request assistance   Assess pain using appropriate pain scale   Administer analgesics based on type and severity of pain and evaluate response   Implement non-pharmacological measures as appropriate and evaluate response

## 2023-03-04 NOTE — PROGRESS NOTES
PPD #2     Patient:  Yu Guaman     Admit Date:  3/2/2023 12:14 PM  Medical Record Number:  [id number]   Today's Date: 3/4/2023    S: No complaints, doing well; tolerating diet: yes -general; ambulating well: yes -up in room and in halls; voiding without difficulty:  yes -has no urinary complaints; bm: yes -has had 2; flatus: yes -normal; pain meds appropriate: yes -ibuprofen and Tylenol; vaginal bleeding: Less than a period.     O:   Vitals:    03/03/23 0019 03/03/23 0748 03/03/23 2324 03/04/23 0829   BP: 135/70 124/70 (!) 104/51 130/63   Pulse: 98 99 88 90   Resp: 16 16 16 18   Temp: 98.3 °F (36.8 °C) 97.7 °F (36.5 °C) 98.1 °F (36.7 °C) 98.6 °F (37 °C)   TempSrc: Oral Oral Oral Oral   SpO2: 99% 99% 99% 97%   Weight:       Height:         Gen: A&O, cooperative, pleasant   Heart: RRR   Lungs: CTA b/l   Abd; soft, NT, non distended, +BS  Back: no CVA or paraspinal tenderness   Ext: No clubbing, cyanosis, edema:trace , no cords palpable, no calf tenderness   Neuro: intact   Uterus: firm, well contracted, nt   Perineum: intact, healing well   Maggie pad: staining only, thin lochia    Lab Results   Component Value Date    HGB 11.9 03/02/2023    HCT 36.1 03/02/2023      Recent Results (from the past 72 hour(s))   CBC    Collection Time: 03/02/23  1:35 PM   Result Value Ref Range    WBC 10.2 4.5 - 11.5 E9/L    RBC 4.09 3.50 - 5.50 E12/L    Hemoglobin 11.9 11.5 - 15.5 g/dL    Hematocrit 36.1 34.0 - 48.0 %    MCV 88.3 80.0 - 99.9 fL    MCH 29.1 26.0 - 35.0 pg    MCHC 33.0 32.0 - 34.5 %    RDW 14.9 11.5 - 15.0 fL    Platelets 696 887 - 950 E9/L    MPV 12.9 (H) 7.0 - 12.0 fL   TYPE AND SCREEN    Collection Time: 03/02/23  1:35 PM   Result Value Ref Range    ABO/Rh O POS     Antibody Screen NEG    Urine Drug Screen    Collection Time: 03/02/23  1:52 PM   Result Value Ref Range    Amphetamine Screen, Urine NOT DETECTED Negative <1000 ng/mL    Barbiturate Screen, Ur NOT DETECTED Negative < 200 ng/mL    Benzodiazepine Screen, Urine NOT DETECTED Negative < 200 ng/mL    Cannabinoid Scrn, Ur NOT DETECTED Negative < 50ng/mL    Cocaine Metabolite Screen, Urine NOT DETECTED Negative < 300 ng/mL    Opiate Scrn, Ur NOT DETECTED Negative < 300ng/mL    PCP Screen, Urine NOT DETECTED Negative < 25 ng/mL    Methadone Screen, Urine NOT DETECTED Negative <300 ng/mL    Oxycodone Urine NOT DETECTED Negative <100 ng/mL    FENTANYL SCREEN, URINE NOT DETECTED Negative <1 ng/mL    Drug Screen Comment: see below        A: 32 y.o. female  at 39w2d weeks  PPD #2 S/P ; Episiotomy was not needed due to a spontaneous 1 cm 1st degree (mucosa only) vaginal laceration  Pregravid BMI 45.56  H/O GDM prior pregnancy  Hypothyroidism due to Hashimoto's  Patient Active Problem List   Diagnosis    Thyroid disease    BMI 40.0-44.9, adult (pregravid BMI 45.56    Third pregnancy    Polyhydramnios in third trimester (29.97at 39w1d)     39 weeks gestation of pregnancy    Obesity complicating pregnancy in third trimester    Excessive fetal growth affecting management of mother in payne pregnancy in third trimester, antepartum (7#15oz 85.2% at [address] [place] [address])    History of gestational diabetes in prior pregnancy, currently pregnant in third trimester    Hypothyroidism due to Hashimoto's thyroiditis    Hypothyroidism complicating pregnancy in third trimester       P: Routine PP care. Discharge home with instructions, precautions. Prescription for Ibuprofen 600 mg. May use over-the-counter Tylenol as needed. Continue PNV with Iron daily. Follow-up office 6 weeks for postpartum visit.     Aria Meza MD FACOG  3/4/2023 1:35 PM

## 2023-03-04 NOTE — PROGRESS NOTES
Discharged in stable condition to hospital exit in wheelchair carrying Baby in car seat.  is present at time  of discharge. Escorted to exit by PCA.

## 2023-03-04 NOTE — PROGRESS NOTES
Discharge instructions discussed with patient and . All questions answered. Pt verbalized understanding of these instructions.

## 2023-03-04 NOTE — LACTATION NOTE
Mom continues to exclusively breastfeed her baby with the complaint of baby falling asleep to quickly at the breast.  Taught mom how to provide breast support throughout the feeding and to provide breast compressions when baby pauses too long. Audible swallows were observed. Discussed dysmorphic letdown reflex when mom pumped milk in the past.  Gave mom info on this condition and had discussion. Encouraged mom to call us with questions or for assistance.

## 2023-03-04 NOTE — DISCHARGE INSTRUCTIONS

## 2023-03-20 ENCOUNTER — TELEMEDICINE (OUTPATIENT)
Dept: ENDOCRINOLOGY | Age: 28
End: 2023-03-20
Payer: COMMERCIAL

## 2023-03-20 DIAGNOSIS — E03.9 ACQUIRED HYPOTHYROIDISM: ICD-10-CM

## 2023-03-20 DIAGNOSIS — E66.9 OBESITY, UNSPECIFIED CLASSIFICATION, UNSPECIFIED OBESITY TYPE, UNSPECIFIED WHETHER SERIOUS COMORBIDITY PRESENT: Primary | ICD-10-CM

## 2023-03-20 PROCEDURE — G8427 DOCREV CUR MEDS BY ELIG CLIN: HCPCS | Performed by: INTERNAL MEDICINE

## 2023-03-20 PROCEDURE — 1111F DSCHRG MED/CURRENT MED MERGE: CPT | Performed by: INTERNAL MEDICINE

## 2023-03-20 PROCEDURE — 1036F TOBACCO NON-USER: CPT | Performed by: INTERNAL MEDICINE

## 2023-03-20 PROCEDURE — G8484 FLU IMMUNIZE NO ADMIN: HCPCS | Performed by: INTERNAL MEDICINE

## 2023-03-20 PROCEDURE — G8417 CALC BMI ABV UP PARAM F/U: HCPCS | Performed by: INTERNAL MEDICINE

## 2023-03-20 PROCEDURE — 99214 OFFICE O/P EST MOD 30 MIN: CPT | Performed by: INTERNAL MEDICINE

## 2023-03-20 RX ORDER — LEVOTHYROXINE SODIUM 25 MCG
TABLET ORAL
Qty: 34 TABLET | Refills: 11 | Status: SHIPPED | OUTPATIENT
Start: 2023-03-20

## 2023-03-20 NOTE — PROGRESS NOTES
Will Urbina was read the following message We want to confirm that, for purposes of billing, this is a virtual visit with your provider for which we will submit a claim for reimbursement with your insurance company. You will be responsible for any copays, coinsurance amounts or other amounts not covered by your insurance company. If you do not accept this, unfortunately we will not be able to schedule or proceed with a virtual visit with the provider. Do you accept? Abbey responded Yes .
& RECOMMENDATIONS   Hailey Montiel, a 32 y.o.-old female seen in for the following issues       Assessment:      Diagnosis Orders   1. Obesity, unspecified classification, unspecified obesity type, unspecified whether serious comorbidity present        2. Acquired hypothyroidism  SYNTHROID 25 MCG tablet    T4, Free    TSH        I personally reviewed external notes from PCP and other patient's care team providers, and personally interpreted labs associated with the above diagnosis. I also ordered labs to further assess and manage the above addressed medical conditions    Return in about 6 months (around 9/20/2023) for hypothyroidism, VitD deficiency. The above issues were reviewed with the patient who understood and agreed with the plan. Thank you for allowing us to participate in the care of this patient. Please do not hesitate to contact us with any additional questions. Vahid Manley MD  [facility]   [address] [place], [place] [address]   Phone: [phone]  Fax: [phone]  --------------------------------------------  An electronic signature was used to authenticate this note.  Vahid Manley MD on 3/20/2023 at 10:15 AM

## 2023-03-25 PROBLEM — O99.283 HYPOTHYROIDISM COMPLICATING PREGNANCY IN THIRD TRIMESTER: Status: RESOLVED | Noted: 2023-03-02 | Resolved: 2023-03-25

## 2023-03-25 PROBLEM — E03.9 HYPOTHYROIDISM COMPLICATING PREGNANCY IN THIRD TRIMESTER: Status: RESOLVED | Noted: 2023-03-02 | Resolved: 2023-03-25

## 2023-03-25 PROBLEM — O09.293 HISTORY OF GESTATIONAL DIABETES IN PRIOR PREGNANCY, CURRENTLY PREGNANT IN THIRD TRIMESTER: Status: RESOLVED | Noted: 2023-03-02 | Resolved: 2023-03-25

## 2023-03-25 PROBLEM — E66.9 OBESITY: Status: ACTIVE | Noted: 2023-03-25

## 2023-03-25 PROBLEM — Z3A.39 39 WEEKS GESTATION OF PREGNANCY: Status: RESOLVED | Noted: 2023-03-02 | Resolved: 2023-03-25

## 2023-03-25 PROBLEM — Z86.32 HISTORY OF GESTATIONAL DIABETES IN PRIOR PREGNANCY, CURRENTLY PREGNANT IN THIRD TRIMESTER: Status: RESOLVED | Noted: 2023-03-02 | Resolved: 2023-03-25

## 2023-03-25 PROBLEM — O99.213 OBESITY COMPLICATING PREGNANCY IN THIRD TRIMESTER: Status: RESOLVED | Noted: 2023-03-02 | Resolved: 2023-03-25

## 2023-03-25 PROBLEM — O40.3XX0 POLYHYDRAMNIOS IN THIRD TRIMESTER: Status: RESOLVED | Noted: 2023-01-23 | Resolved: 2023-03-25

## 2023-03-25 PROBLEM — O36.63X0 EXCESSIVE FETAL GROWTH AFFECTING MANAGEMENT OF MOTHER IN SINGLETON PREGNANCY IN THIRD TRIMESTER, ANTEPARTUM: Status: RESOLVED | Noted: 2023-03-02 | Resolved: 2023-03-25

## 2023-03-25 PROBLEM — Z34.90 THIRD PREGNANCY: Status: RESOLVED | Noted: 2023-01-23 | Resolved: 2023-03-25

## 2023-04-03 DIAGNOSIS — E03.9 ACQUIRED HYPOTHYROIDISM: ICD-10-CM

## 2023-04-03 LAB
T4 FREE SERPL-MCNC: 1.37 NG/DL (ref 0.93–1.7)
TSH SERPL-MCNC: 1.24 UIU/ML (ref 0.27–4.2)

## 2023-04-04 ENCOUNTER — TELEPHONE (OUTPATIENT)
Dept: ENDOCRINOLOGY | Age: 28
End: 2023-04-04

## 2023-04-04 DIAGNOSIS — E03.9 ACQUIRED HYPOTHYROIDISM: Primary | ICD-10-CM

## 2023-04-04 NOTE — TELEPHONE ENCOUNTER
Endocrine staff,   Please notify pt that I have reviewed your recent results. Thyroid hormones results are excellent. There is no need for a change in your therapy at this time.

## 2023-04-07 DIAGNOSIS — E03.9 ACQUIRED HYPOTHYROIDISM: ICD-10-CM

## 2023-04-07 RX ORDER — LEVOTHYROXINE SODIUM 25 MCG
TABLET ORAL
Qty: 34 TABLET | Refills: 11 | Status: SHIPPED | OUTPATIENT
Start: 2023-04-07

## 2023-04-17 PROBLEM — E06.3 HYPOTHYROIDISM DUE TO HASHIMOTO'S THYROIDITIS: Chronic | Status: ACTIVE | Noted: 2023-03-02

## 2023-04-17 PROBLEM — O09.43 HIGH RISK MULTIGRAVIDA, THIRD TRIMESTER: Status: ACTIVE | Noted: 2023-04-17

## 2023-04-17 PROBLEM — E03.8 HYPOTHYROIDISM DUE TO HASHIMOTO'S THYROIDITIS: Chronic | Status: ACTIVE | Noted: 2023-03-02

## 2023-04-17 PROBLEM — Z34.83 MULTIGRAVIDA IN THIRD TRIMESTER: Status: ACTIVE | Noted: 2023-04-17

## 2023-04-18 PROBLEM — O09.43 HIGH RISK MULTIGRAVIDA, THIRD TRIMESTER: Status: RESOLVED | Noted: 2023-04-17 | Resolved: 2023-04-18

## 2023-04-18 PROBLEM — Z86.32 HISTORY OF GESTATIONAL DIABETES IN PRIOR PREGNANCY, CURRENTLY PREGNANT IN THIRD TRIMESTER: Status: RESOLVED | Noted: 2023-03-02 | Resolved: 2023-04-18

## 2023-04-18 PROBLEM — O09.293 HISTORY OF GESTATIONAL DIABETES IN PRIOR PREGNANCY, CURRENTLY PREGNANT IN THIRD TRIMESTER: Status: RESOLVED | Noted: 2023-03-02 | Resolved: 2023-04-18

## 2023-04-18 PROBLEM — E03.9 HYPOTHYROIDISM COMPLICATING PREGNANCY IN THIRD TRIMESTER: Status: RESOLVED | Noted: 2023-03-02 | Resolved: 2023-04-18

## 2023-04-18 PROBLEM — O99.283 HYPOTHYROIDISM COMPLICATING PREGNANCY IN THIRD TRIMESTER: Status: RESOLVED | Noted: 2023-03-02 | Resolved: 2023-04-18

## 2023-04-18 PROBLEM — O40.3XX0 POLYHYDRAMNIOS IN THIRD TRIMESTER: Status: RESOLVED | Noted: 2023-01-23 | Resolved: 2023-04-18

## 2023-04-18 PROBLEM — Z34.90 THIRD PREGNANCY: Status: RESOLVED | Noted: 2023-01-23 | Resolved: 2023-04-18

## 2023-04-18 PROBLEM — O99.213 OBESITY COMPLICATING PREGNANCY IN THIRD TRIMESTER: Status: RESOLVED | Noted: 2023-03-02 | Resolved: 2023-04-18

## 2023-04-18 PROBLEM — Z3A.39 39 WEEKS GESTATION OF PREGNANCY: Status: RESOLVED | Noted: 2023-03-02 | Resolved: 2023-04-18

## 2023-04-18 PROBLEM — O36.63X0 EXCESSIVE FETAL GROWTH AFFECTING MANAGEMENT OF MOTHER IN SINGLETON PREGNANCY IN THIRD TRIMESTER, ANTEPARTUM: Status: RESOLVED | Noted: 2023-03-02 | Resolved: 2023-04-18

## 2023-07-09 NOTE — DISCHARGE INSTR - ACTIVITY
After Your Delivery Discharge Instructions    What to do after you leave the hospital:    Recommended diet: regular diet  Recommended activity: No driving for 1 weeks, no sex or tampon use for 6 weeks. No douching. No heavy lifting (greater than baby and carrier) for 6 weeks. Initially, avoid frequent ambulation with stairs - start slowly then increase as tolerated. You may return to work in 6 weeks. Showers are fine - avoid bath tubs, hot tubs, swimming. Follow-up in 6 weeks for final postpartum visit. Call the Physician with any of these signs and symptoms:    Warning signs regarding stitches:  \"Popping\" of stitches  Foul smelling discharge or pus  More redness or streaks around stitches than before    Perineum / episiotomy care:  Continue care as in the hospital (sitz baths, squirt bottle, etc.)  No tub baths until OK'd by your Physician , showers are fine     After your delivery - signs and symptoms to watch for:  Fever - Oral temperature greater than 100.4 degrees Fahrenheit  Foul-smelling vaginal discharge  Headache unrelieved by \"pain meds\"  Difficulty urinating  Breasts reddened, hard, hot to the touch  Nipple discharge which is foul-smelling or contains pus  Increased pain at the site of the laceration repair  Difficulty breathing with or without chest pain  New calf pain especially if only on one side  Sudden, continuing increased vaginal bleeding (heavier than a period) with or without clots  Unrelieved feelings of:   Inability to cope  Sadness  Anxiety  Lack of interest in baby  Insomnia  Crying     What to do at home:  Resume activity gradually   Don't lift anything heavier than baby and carrier until OK'd by your Physician   No sex until OK'd by your Physician  Eat regular nutritious meals  Take pain medication as prescribed whenever you need them  To avoid/relieve constipation take stool softeners if advised   Increase fiber in your diet    Most importantly ENJOY your Baby!  - Dr. Reinaldo Schumacher =)))    Please call immediately with Questions and Concerns - [phone] (Office) or [phone] (Answering Service) after hours and weekends. By signing below, I understand that if any emergent problems occur, once I leave the hospital, I am to dial #[phone] or go immediately to the nearest Emergency Room. For less emergent matters,  Dr. Rebeca Galan can be reached by calling his Office or  answering service at the above numbers. I understand and acknowledge receipt of the instructions indicated above. No

## 2023-09-20 ENCOUNTER — TELEMEDICINE (OUTPATIENT)
Dept: ENDOCRINOLOGY | Age: 28
End: 2023-09-20

## 2023-09-20 DIAGNOSIS — E03.9 ACQUIRED HYPOTHYROIDISM: Primary | ICD-10-CM

## 2023-09-20 DIAGNOSIS — E55.9 VITAMIN D DEFICIENCY: ICD-10-CM

## 2023-09-20 NOTE — PROGRESS NOTES
[facility] Department of Endocrinology Diabetes and Metabolism   [facility] [facility] [address]   Phone: [phone]  Fax: [phone]    Date of Service: 9/20/2023  Primary Care Physician: Jesenia Guaman DO  Provider: Samira Bhatti MD     Reason for the visit:  Hypothyroidism     History of Present Illness: The history is provided by the patient. No  was used. Accuracy of the patient data is excellent. Elena Salamanca is a very pleasant 25 y.o. female seen today for diabetes management     Pt 1st diagnosed with hypothyroidism on dx in 2020 dx during pregnancy. The patient is currently on namebrand Synthroid 25 mcg 1 tablet 6 days a week and 2 tablets on Sunday. The patient has been taking Synthroid in the morning on an empty stomach, waiting one hour before eating, avoiding multivitamins containing calcium or iron with it    She used to be on NP thyroid but had palpitation and dizziness did not feel well on it  TFT was normal in April 2023. Patient is due for blood work now  Lab Results   Component Value Date    TSH 1.240 04/03/2023    FT3 3.0 12/16/2022    Eulalia Lente 1.37 04/03/2023     Denies family hx of thyroid disease     PAST MEDICAL HISTORY   Past Medical History:   Diagnosis Date    Abnormal Pap smear of cervix     Autoimmune disorder ([address])     Hashimotos    COVID-19 virus infection 12/09/2021    Depression     2nd pregnancy    Diabetes mellitus ([address])     prev pregnancy    First degree laceration of perineum, delivered, current hospitalization 3/2/2023    Gestational diabetes mellitus     1st pregnancy    Hypothyroidism     STD (sexually transmitted disease)     HPV    Thyroid disease     hypothyroidism       PAST SURGICAL HISTORY   No past surgical history on file. SOCIAL HISTORY   Tobacco:   reports that she has never smoked.  She has never used smokeless tobacco.  Alcohol:   reports that she does not currently use

## 2023-10-10 ENCOUNTER — TELEPHONE (OUTPATIENT)
Dept: ENDOCRINOLOGY | Age: 28
End: 2023-10-10

## 2023-10-10 DIAGNOSIS — E03.9 ACQUIRED HYPOTHYROIDISM: Primary | ICD-10-CM

## 2023-10-20 ENCOUNTER — TELEPHONE (OUTPATIENT)
Dept: ENDOCRINOLOGY | Age: 28
End: 2023-10-20

## 2024-02-05 LAB
BACTERIA: ABNORMAL
BILIRUBIN URINE: NEGATIVE
COLOR: YELLOW
GLUCOSE URINE: NEGATIVE MG/DL
KETONES, URINE: NEGATIVE MG/DL
LEUKOCYTE ESTERASE, URINE: NEGATIVE
NITRITE, URINE: NEGATIVE
PH UA: 7 (ref 5–9)
PROTEIN UA: NEGATIVE MG/DL
RBC UA: ABNORMAL /HPF
SPECIFIC GRAVITY UA: 1.02 (ref 1–1.03)
TURBIDITY: CLEAR
URINE HGB: ABNORMAL
UROBILINOGEN, URINE: 0.2 EU/DL (ref 0–1)
WBC UA: ABNORMAL /HPF

## 2024-02-06 LAB
CULTURE: NORMAL
SPECIMEN DESCRIPTION: NORMAL

## 2024-04-19 ENCOUNTER — TELEPHONE (OUTPATIENT)
Dept: ENDOCRINOLOGY | Age: 29
End: 2024-04-19

## 2024-04-19 NOTE — TELEPHONE ENCOUNTER
Notify patient  Thyroid hormones are very good, I recommend continue current dose of the thyroid medication

## 2024-05-31 ENCOUNTER — HOSPITAL ENCOUNTER (EMERGENCY)
Age: 29
Discharge: HOME | End: 2024-06-01
Attending: STUDENT IN AN ORGANIZED HEALTH CARE EDUCATION/TRAINING PROGRAM
Payer: COMMERCIAL

## 2024-05-31 VITALS
RESPIRATION RATE: 22 BRPM | TEMPERATURE: 98.7 F | HEART RATE: 92 BPM | DIASTOLIC BLOOD PRESSURE: 83 MMHG | OXYGEN SATURATION: 100 % | SYSTOLIC BLOOD PRESSURE: 138 MMHG

## 2024-05-31 DIAGNOSIS — Z3A.27 27 WEEKS GESTATION OF PREGNANCY: ICD-10-CM

## 2024-05-31 DIAGNOSIS — R42 LIGHTHEADEDNESS: Primary | ICD-10-CM

## 2024-05-31 LAB
ALBUMIN SERPL-MCNC: 3.9 G/DL (ref 3.5–5.2)
ALP SERPL-CCNC: 82 U/L (ref 35–104)
ALT SERPL-CCNC: <5 U/L (ref 0–32)
ANION GAP SERPL CALCULATED.3IONS-SCNC: 11 MMOL/L (ref 7–16)
AST SERPL-CCNC: 8 U/L (ref 0–31)
BASOPHILS # BLD: 0.04 K/UL (ref 0–0.2)
BASOPHILS NFR BLD: 0 % (ref 0–2)
BILIRUB SERPL-MCNC: <0.2 MG/DL (ref 0–1.2)
BUN SERPL-MCNC: 5 MG/DL (ref 6–20)
CALCIUM SERPL-MCNC: 9.3 MG/DL (ref 8.6–10.2)
CHLORIDE SERPL-SCNC: 104 MMOL/L (ref 98–107)
CK SERPL-CCNC: 27 U/L (ref 20–180)
CO2 SERPL-SCNC: 22 MMOL/L (ref 22–29)
CREAT SERPL-MCNC: 0.5 MG/DL (ref 0.5–1)
EOSINOPHIL # BLD: 0.13 K/UL (ref 0.05–0.5)
EOSINOPHILS RELATIVE PERCENT: 1 % (ref 0–6)
ERYTHROCYTE [DISTWIDTH] IN BLOOD BY AUTOMATED COUNT: 14.3 % (ref 11.5–15)
GFR, ESTIMATED: >90 ML/MIN/1.73M2
GLUCOSE SERPL-MCNC: 95 MG/DL (ref 74–99)
HCT VFR BLD AUTO: 36.3 % (ref 34–48)
HGB BLD-MCNC: 11.7 G/DL (ref 11.5–15.5)
IMM GRANULOCYTES # BLD AUTO: 0.13 K/UL (ref 0–0.58)
IMM GRANULOCYTES NFR BLD: 1 % (ref 0–5)
LYMPHOCYTES NFR BLD: 2.75 K/UL (ref 1.5–4)
LYMPHOCYTES RELATIVE PERCENT: 24 % (ref 20–42)
MAGNESIUM SERPL-MCNC: 2 MG/DL (ref 1.6–2.6)
MCH RBC QN AUTO: 28.7 PG (ref 26–35)
MCHC RBC AUTO-ENTMCNC: 32.2 G/DL (ref 32–34.5)
MCV RBC AUTO: 89.2 FL (ref 80–99.9)
MONOCYTES NFR BLD: 0.53 K/UL (ref 0.1–0.95)
MONOCYTES NFR BLD: 5 % (ref 2–12)
NEUTROPHILS NFR BLD: 69 % (ref 43–80)
NEUTS SEG NFR BLD: 8.04 K/UL (ref 1.8–7.3)
PLATELET # BLD AUTO: 238 K/UL (ref 130–450)
PMV BLD AUTO: 10.7 FL (ref 7–12)
POTASSIUM SERPL-SCNC: 4.6 MMOL/L (ref 3.5–5)
PROT SERPL-MCNC: 7 G/DL (ref 6.4–8.3)
RBC # BLD AUTO: 4.07 M/UL (ref 3.5–5.5)
SODIUM SERPL-SCNC: 137 MMOL/L (ref 132–146)
TSH SERPL DL<=0.05 MIU/L-ACNC: 3.63 UIU/ML (ref 0.27–4.2)
WBC OTHER # BLD: 11.6 K/UL (ref 4.5–11.5)

## 2024-05-31 PROCEDURE — 96360 HYDRATION IV INFUSION INIT: CPT

## 2024-05-31 PROCEDURE — 84443 ASSAY THYROID STIM HORMONE: CPT

## 2024-05-31 PROCEDURE — 2580000003 HC RX 258: Performed by: STUDENT IN AN ORGANIZED HEALTH CARE EDUCATION/TRAINING PROGRAM

## 2024-05-31 PROCEDURE — 84439 ASSAY OF FREE THYROXINE: CPT

## 2024-05-31 PROCEDURE — 83735 ASSAY OF MAGNESIUM: CPT

## 2024-05-31 PROCEDURE — 93005 ELECTROCARDIOGRAM TRACING: CPT | Performed by: STUDENT IN AN ORGANIZED HEALTH CARE EDUCATION/TRAINING PROGRAM

## 2024-05-31 PROCEDURE — 96361 HYDRATE IV INFUSION ADD-ON: CPT

## 2024-05-31 PROCEDURE — 85025 COMPLETE CBC W/AUTO DIFF WBC: CPT

## 2024-05-31 PROCEDURE — 80053 COMPREHEN METABOLIC PANEL: CPT

## 2024-05-31 PROCEDURE — 82550 ASSAY OF CK (CPK): CPT

## 2024-05-31 PROCEDURE — 99284 EMERGENCY DEPT VISIT MOD MDM: CPT

## 2024-05-31 RX ORDER — 0.9 % SODIUM CHLORIDE 0.9 %
1000 INTRAVENOUS SOLUTION INTRAVENOUS ONCE
Status: COMPLETED | OUTPATIENT
Start: 2024-05-31 | End: 2024-06-01

## 2024-05-31 RX ADMIN — SODIUM CHLORIDE 1000 ML: 9 INJECTION, SOLUTION INTRAVENOUS at 22:30

## 2024-06-01 LAB
BACTERIA URNS QL MICRO: ABNORMAL
BILIRUB UR QL STRIP: NEGATIVE
CLARITY UR: CLEAR
COLOR UR: YELLOW
EPI CELLS #/AREA URNS HPF: ABNORMAL /HPF
GLUCOSE UR STRIP-MCNC: 100 MG/DL
HGB UR QL STRIP.AUTO: ABNORMAL
KETONES UR STRIP-MCNC: NEGATIVE MG/DL
LEUKOCYTE ESTERASE UR QL STRIP: NEGATIVE
MUCOUS THREADS URNS QL MICRO: PRESENT
NITRITE UR QL STRIP: NEGATIVE
PH UR STRIP: 6 [PH] (ref 5–9)
PROT UR STRIP-MCNC: NEGATIVE MG/DL
RBC #/AREA URNS HPF: ABNORMAL /HPF
SP GR UR STRIP: 1.02 (ref 1–1.03)
T4 FREE SERPL-MCNC: 0.8 NG/DL (ref 0.9–1.7)
UROBILINOGEN UR STRIP-ACNC: 0.2 EU/DL (ref 0–1)
WBC #/AREA URNS HPF: ABNORMAL /HPF

## 2024-06-01 PROCEDURE — 81001 URINALYSIS AUTO W/SCOPE: CPT

## 2024-06-01 NOTE — ED PROVIDER NOTES
[facility] EMERGENCY DEPARTMENT  EMERGENCY DEPARTMENT ENCOUNTER        Pt Name: Abbey Mclaughlin  MRN: [id number]  Birthdate 1995  Date of evaluation: 5/31/2024  Provider: Yandy Arrington DO  PCP: Mk Acevedo DO  Note Started: 10:11 PM EDT 5/31/24    CHIEF COMPLAINT       Chief Complaint   Patient presents with    Dizziness    Palpitations       HISTORY OF PRESENT ILLNESS: 1 or more Elements   History From: ***    {Limitations to history (Optional):17579}    Abbey Mclaughlin is a 28 y.o. female who presents ***    Nursing Notes were all reviewed and agreed with or any disagreements were addressed in the HPI.    REVIEW OF SYSTEMS :      Review of Systems    Positives and Pertinent negatives as per HPI.     SURGICAL HISTORY   No past surgical history on file.    CURRENTMEDICATIONS       Previous Medications    DOCUSATE SODIUM (COLACE, DULCOLAX) 100 MG CAPS    Take 100 mg by mouth 2 times daily as needed for Constipation    MAGNESIUM 30 MG TABLET    Take 1 tablet by mouth 2 times daily    PRENATAL MV-MIN-FE FUM-FA-DHA (PRENATAL 1 PO)    Take by mouth daily    SYNTHROID 50 MCG TABLET    Take 1 tablet by mouth Daily    VITAMIN B-12 (CYANOCOBALAMIN) 500 MCG TABLET    Take 1 tablet by mouth daily       ALLERGIES     Patient has no known allergies.    FAMILYHISTORY     No family history on file.     SOCIAL HISTORY       Social History     Tobacco Use    Smoking status: Never    Smokeless tobacco: Never   Vaping Use    Vaping Use: Never used   Substance Use Topics    Alcohol use: Not Currently    Drug use: Not Currently       SCREENINGS        Ethan Coma Scale  Eye Opening: Spontaneous  Best Verbal Response: Oriented  Best Motor Response: Obeys commands  [place] Coma Scale Score: 15                CIWA Assessment  BP: 138/83  Pulse: 92           PHYSICAL EXAM  1 or more Elements     ED Triage Vitals [05/31/24 1948]   BP Temp Temp src Pulse Respirations

## 2024-06-01 NOTE — DISCHARGE INSTRUCTIONS
Follow-up with family doctor, return for any significant worsening symptoms or other acute symptoms or concerns

## 2024-06-03 LAB
EKG ATRIAL RATE: 87 BPM
EKG P AXIS: 67 DEGREES
EKG P-R INTERVAL: 130 MS
EKG Q-T INTERVAL: 378 MS
EKG QRS DURATION: 80 MS
EKG QTC CALCULATION (BAZETT): 454 MS
EKG R AXIS: 16 DEGREES
EKG T AXIS: 14 DEGREES
EKG VENTRICULAR RATE: 87 BPM

## 2024-06-03 PROCEDURE — 93010 ELECTROCARDIOGRAM REPORT: CPT | Performed by: INTERNAL MEDICINE

## 2024-06-12 DIAGNOSIS — E06.3 HASHIMOTO'S THYROIDITIS: ICD-10-CM

## 2024-06-12 DIAGNOSIS — Z34.83 MULTIGRAVIDA IN THIRD TRIMESTER: ICD-10-CM

## 2024-08-05 NOTE — PROGRESS NOTES
Patient admitted to room 321. New admission assessment and vitals as charted. New admission paperwork gone over with the patient, including Tdap,Flu, and hepatitis B vaccines. Patient refused the Tdap,flu and Hepatitis B vaccines. Safe sleep poster gone over with patient. Visitation policy explained to the patient including only one person over the age of 25 staying the night. Ppd paper given and explained to patient.  All questions answered at this time Detail Level: Detailed Biopsy Photograph Reviewed: Yes Number Of Curettages: 2 Size Of Lesion In Cm: 2.1 Size Of Lesion After Curettage: 2.5 Add Intralesional Injection: No Total Volume (Ccs): 1 Anesthesia Type: 1% lidocaine with epinephrine Cautery Type: electrodesiccation What Was Performed First?: Curettage Final Size Statement: The size of the lesion after curettage was Additional Information: (Optional): The wound was cleaned, and a pressure dressing was applied.  The patient received detailed post-op instructions. Consent was obtained from the patient. The risks, benefits and alternatives to therapy were discussed in detail. Specifically, the risks of infection, scarring, bleeding, prolonged wound healing, nerve injury, incomplete removal, allergy to anesthesia and recurrence were addressed. Alternatives to ED&C, such as: surgical removal and XRT were also discussed.  Prior to the procedure, the treatment site was clearly identified and confirmed by the patient. All components of Universal Protocol/PAUSE Rule completed. Post-Care Instructions: I reviewed with the patient in detail post-care instructions. Patient is to keep the area dry for 48 hours, and not to engage in any swimming until the area is healed. Should the patient develop any fevers, chills, bleeding, severe pain patient will contact the office immediately. Bill As A Line Item Or As Units: Line Item

## 2024-08-08 PROBLEM — E06.3 HASHIMOTO'S THYROIDITIS: Status: ACTIVE | Noted: 2024-08-08

## 2024-08-08 PROBLEM — Z28.39 MATERNAL VARICELLA, NON-IMMUNE: Status: ACTIVE | Noted: 2024-08-08

## 2024-08-08 PROBLEM — Z86.32 HISTORY OF GESTATIONAL DIABETES IN PRIOR PREGNANCY, CURRENTLY PREGNANT: Status: ACTIVE | Noted: 2024-08-08

## 2024-08-08 PROBLEM — O09.299 HISTORY OF GESTATIONAL DIABETES IN PRIOR PREGNANCY, CURRENTLY PREGNANT: Status: ACTIVE | Noted: 2024-08-08

## 2024-08-08 PROBLEM — O09.899 MATERNAL VARICELLA, NON-IMMUNE: Status: ACTIVE | Noted: 2024-08-08

## 2024-08-08 PROBLEM — O09.299 HISTORY OF MACROSOMIA IN INFANT IN PRIOR PREGNANCY, CURRENTLY PREGNANT: Status: ACTIVE | Noted: 2024-08-08

## 2024-08-08 PROBLEM — O99.213 OBESITY AFFECTING PREGNANCY IN THIRD TRIMESTER: Status: ACTIVE | Noted: 2024-08-08

## 2024-08-08 PROBLEM — E55.9 VITAMIN D DEFICIENCY: Status: ACTIVE | Noted: 2024-08-08

## 2024-08-12 PROBLEM — O09.893 SHORT INTERVAL BETWEEN PREGNANCIES AFFECTING PREGNANCY IN THIRD TRIMESTER, ANTEPARTUM: Status: ACTIVE | Noted: 2024-08-12

## 2024-08-15 ENCOUNTER — HOSPITAL ENCOUNTER (OUTPATIENT)
Age: 29
Discharge: HOME | End: 2024-08-15
Attending: OBSTETRICS & GYNECOLOGY | Admitting: OBSTETRICS & GYNECOLOGY
Payer: COMMERCIAL

## 2024-08-15 VITALS — DIASTOLIC BLOOD PRESSURE: 82 MMHG | SYSTOLIC BLOOD PRESSURE: 139 MMHG | TEMPERATURE: 97.9 F | HEART RATE: 114 BPM

## 2024-08-15 PROBLEM — Z03.71 ENCOUNTER FOR SUSPECTED PREMATURE RUPTURE OF AMNIOTIC MEMBRANES, WITH RUPTURE OF MEMBRANES NOT FOUND: Status: ACTIVE | Noted: 2024-08-15

## 2024-08-15 PROBLEM — Z3A.38 38 WEEKS GESTATION OF PREGNANCY: Status: ACTIVE | Noted: 2024-08-15

## 2024-08-15 LAB
AMNISURE, POC: NEGATIVE
Lab: NORMAL
NEGATIVE QC PASS/FAIL: NORMAL
POSITIVE QC PASS/FAIL: NORMAL

## 2024-08-15 PROCEDURE — 99211 OFF/OP EST MAY X REQ PHY/QHP: CPT

## 2024-08-15 NOTE — PROGRESS NOTES
38 weeks, presents to unit with complaints of SROM after intercourse last night around 0030, noted again this morning around 0730. States cramping and dull lower back pain. Denies VB. EFM applied, Perceives good FM.

## 2024-08-15 NOTE — PROGRESS NOTES
Patient seen with ashli banks  Complaining of lof. Irregular ctx's  Amnisure neg  Intermittent ctx's  Fht's 130-140 with accels  Cx closed/thick/-3  Ashli to notify dr ramos of the above and the bp

## 2024-08-15 NOTE — PROGRESS NOTES
Discharge instructions given to pt. Pt verbalizes understanding of instructions. Pt leaves unit ambulatory.

## 2024-08-15 NOTE — DISCHARGE INSTRUCTIONS
Home Undelivered Discharge Instructions    After Discharge Orders:    Follow up with physician on your next scheduled appointment            Diet:  normal diet as tolerated    Rest: on left side, normal activity as tolerated, no sex, no douching and no tub baths    Other instructions: Do kick counts once a day on your baby. Choose the time of day your baby is most active. Get in a comfortable lying or sitting position and time how long it takes to feel 10 kicks, twists, turns, swishes, or rolls. Call your physician or midwife if there have not been 10 kicks in 2 hours    Call physician or midwife, return to Labor and Delivery, call 911, or go to the nearest Emergency Room if: increased leakage or fluid, contractions more than  6 per  1 hour, decreased fetal movement, persistent low back pain or cramping, bleeding from vaginal area, difficulty urinating, pain with urination, difficulty breathing, new calf pain, persistent headache or vision change

## 2024-08-15 NOTE — H&P
Department of Obstetrics and Gynecology  Physician Assistant Obstetrics History and Physical      HISTORY OF PRESENT ILLNESS:      The patient is a 28 y.o.  4 parity 3 at 38 weeks' 0 days' gestation presents to L&D Antepartum from home due to c/o leakage of fluid after intercourse last night around 0030, noted again this morning around 0730. Amnisure is negative .Denies continued leakage. States occasional cramping and dull lower back pain.    Current obstetric history is significant for:  Multigravida    Estimated Due Date:  2024  Contractions: Unsure per patient  Leaking of fluid: Yes, Amnisure (-)  Bleeding:  No  Perceived fetal movement: Good        PAST OB HISTORY:  OB History    Para Term  AB Living   4 3 3     3   SAB IAB Ectopic Molar Multiple Live Births           0 3      # Outcome Date GA Lbr Kenton/2nd Weight Sex Type Anes PTL Lv   4 Current            3 Term 23 39w2d 06:53 / 00:28 4.13 kg (9 lb 1.7 oz) F Vag-Spont EPI N BERNARD   2 Term 20 39w1d / 01:08 3.65 kg (8 lb 0.8 oz) F Vag-Spont EPI N BERNARD   1 Term 18   3.657 kg (8 lb 1 oz) M Vag-Spont  N BERNARD           Pre-eclampsia:  No      :  No      D & C:  No      Cerclage:  No      LEEP:  No      Myomectomy:  No       Labor: No    Past Medical History:    Diagnosis Date    Abnormal Pap smear of cervix     Autoimmune disorder (HCC)     Hashimotos    COVID-19 virus infection 2021    Depression     2nd pregnancy    Diabetes mellitus (HCC)     prev pregnancy    First degree laceration of perineum, delivered, current hospitalization 3/2/2023    Gestational diabetes mellitus     1st pregnancy    Hypothyroidism     STD (sexually transmitted disease)     HPV    Thyroid disease     hypothyroidism        Past Surgical History:    No past surgical history on file.     Social History:    Reports that she has never smoked. She has never used smokeless tobacco. She reports that she does not currently use

## 2024-08-25 ENCOUNTER — HOSPITAL ENCOUNTER (INPATIENT)
Age: 29
LOS: 2 days | Discharge: HOME | End: 2024-08-27
Attending: OBSTETRICS & GYNECOLOGY | Admitting: OBSTETRICS & GYNECOLOGY
Payer: COMMERCIAL

## 2024-08-25 ENCOUNTER — APPOINTMENT (OUTPATIENT)
Dept: LABOR AND DELIVERY | Age: 29
End: 2024-08-25
Payer: COMMERCIAL

## 2024-08-25 PROBLEM — Z34.90 ENCOUNTER FOR INDUCTION OF LABOR: Status: ACTIVE | Noted: 2024-08-25

## 2024-08-25 LAB
ABO + RH BLD: NORMAL
AMPHET UR QL SCN: NEGATIVE
ARM BAND NUMBER: NORMAL
BARBITURATES UR QL SCN: NEGATIVE
BENZODIAZ UR QL: NEGATIVE
BLOOD BANK SAMPLE EXPIRATION: NORMAL
BLOOD GROUP ANTIBODIES SERPL: NEGATIVE
BUPRENORPHINE UR QL: NEGATIVE
CANNABINOIDS UR QL SCN: NEGATIVE
COCAINE UR QL SCN: NEGATIVE
ERYTHROCYTE [DISTWIDTH] IN BLOOD BY AUTOMATED COUNT: 15.5 % (ref 11.5–15)
FENTANYL UR QL: NEGATIVE
HCT VFR BLD AUTO: 37 % (ref 34–48)
HGB BLD-MCNC: 12.1 G/DL (ref 11.5–15.5)
MCH RBC QN AUTO: 28.4 PG (ref 26–35)
MCHC RBC AUTO-ENTMCNC: 32.7 G/DL (ref 32–34.5)
MCV RBC AUTO: 86.9 FL (ref 80–99.9)
METHADONE UR QL: NEGATIVE
OPIATES UR QL SCN: NEGATIVE
OXYCODONE UR QL SCN: NEGATIVE
PCP UR QL SCN: NEGATIVE
PLATELET # BLD AUTO: 195 K/UL (ref 130–450)
PMV BLD AUTO: 12.4 FL (ref 7–12)
RBC # BLD AUTO: 4.26 M/UL (ref 3.5–5.5)
TEST INFORMATION: NORMAL
WBC OTHER # BLD: 11 K/UL (ref 4.5–11.5)

## 2024-08-25 PROCEDURE — 85027 COMPLETE CBC AUTOMATED: CPT

## 2024-08-25 PROCEDURE — 2580000003 HC RX 258: Performed by: OBSTETRICS & GYNECOLOGY

## 2024-08-25 PROCEDURE — APPNB30 APP NON BILLABLE TIME 0-30 MINS: Performed by: NURSE PRACTITIONER

## 2024-08-25 PROCEDURE — 1220000001 HC SEMI PRIVATE L&D R&B

## 2024-08-25 PROCEDURE — 80307 DRUG TEST PRSMV CHEM ANLYZR: CPT

## 2024-08-25 PROCEDURE — 3E0P7VZ INTRODUCTION OF HORMONE INTO FEMALE REPRODUCTIVE, VIA NATURAL OR ARTIFICIAL OPENING: ICD-10-PCS | Performed by: OBSTETRICS & GYNECOLOGY

## 2024-08-25 PROCEDURE — 86901 BLOOD TYPING SEROLOGIC RH(D): CPT

## 2024-08-25 PROCEDURE — 6370000000 HC RX 637 (ALT 250 FOR IP): Performed by: OBSTETRICS & GYNECOLOGY

## 2024-08-25 PROCEDURE — 86900 BLOOD TYPING SEROLOGIC ABO: CPT

## 2024-08-25 PROCEDURE — 86850 RBC ANTIBODY SCREEN: CPT

## 2024-08-25 RX ORDER — SODIUM CHLORIDE, SODIUM LACTATE, POTASSIUM CHLORIDE, CALCIUM CHLORIDE 600; 310; 30; 20 MG/100ML; MG/100ML; MG/100ML; MG/100ML
INJECTION, SOLUTION INTRAVENOUS CONTINUOUS
Status: DISCONTINUED | OUTPATIENT
Start: 2024-08-25 | End: 2024-08-26

## 2024-08-25 RX ORDER — TRANEXAMIC ACID 10 MG/ML
1000 INJECTION, SOLUTION INTRAVENOUS
Status: DISCONTINUED | OUTPATIENT
Start: 2024-08-25 | End: 2024-08-26

## 2024-08-25 RX ORDER — METHYLERGONOVINE MALEATE 0.2 MG/ML
200 INJECTION INTRAVENOUS PRN
Status: DISCONTINUED | OUTPATIENT
Start: 2024-08-25 | End: 2024-08-26

## 2024-08-25 RX ORDER — SODIUM CHLORIDE, SODIUM LACTATE, POTASSIUM CHLORIDE, AND CALCIUM CHLORIDE .6; .31; .03; .02 G/100ML; G/100ML; G/100ML; G/100ML
500 INJECTION, SOLUTION INTRAVENOUS PRN
Status: DISCONTINUED | OUTPATIENT
Start: 2024-08-25 | End: 2024-08-26

## 2024-08-25 RX ORDER — CARBOPROST TROMETHAMINE 250 UG/ML
250 INJECTION, SOLUTION INTRAMUSCULAR PRN
Status: DISCONTINUED | OUTPATIENT
Start: 2024-08-25 | End: 2024-08-26

## 2024-08-25 RX ORDER — ONDANSETRON 4 MG/1
4 TABLET, ORALLY DISINTEGRATING ORAL EVERY 6 HOURS PRN
Status: DISCONTINUED | OUTPATIENT
Start: 2024-08-25 | End: 2024-08-26

## 2024-08-25 RX ORDER — TERBUTALINE SULFATE 1 MG/ML
0.25 INJECTION, SOLUTION SUBCUTANEOUS
Status: DISCONTINUED | OUTPATIENT
Start: 2024-08-25 | End: 2024-08-26

## 2024-08-25 RX ORDER — MISOPROSTOL 200 UG/1
400 TABLET ORAL PRN
Status: DISCONTINUED | OUTPATIENT
Start: 2024-08-25 | End: 2024-08-26

## 2024-08-25 RX ORDER — ONDANSETRON 2 MG/ML
4 INJECTION INTRAMUSCULAR; INTRAVENOUS EVERY 6 HOURS PRN
Status: DISCONTINUED | OUTPATIENT
Start: 2024-08-25 | End: 2024-08-26

## 2024-08-25 RX ADMIN — Medication 25 MCG: at 21:52

## 2024-08-25 RX ADMIN — SODIUM CHLORIDE, POTASSIUM CHLORIDE, SODIUM LACTATE AND CALCIUM CHLORIDE: 600; 310; 30; 20 INJECTION, SOLUTION INTRAVENOUS at 20:55

## 2024-08-26 ENCOUNTER — ANESTHESIA (OUTPATIENT)
Dept: LABOR AND DELIVERY | Age: 29
End: 2024-08-26
Payer: COMMERCIAL

## 2024-08-26 ENCOUNTER — ANESTHESIA EVENT (OUTPATIENT)
Dept: LABOR AND DELIVERY | Age: 29
End: 2024-08-26
Payer: COMMERCIAL

## 2024-08-26 PROBLEM — Z3A.39 39 WEEKS GESTATION OF PREGNANCY: Status: ACTIVE | Noted: 2024-08-15

## 2024-08-26 PROBLEM — Z03.71 ENCOUNTER FOR SUSPECTED PREMATURE RUPTURE OF AMNIOTIC MEMBRANES, WITH RUPTURE OF MEMBRANES NOT FOUND: Status: RESOLVED | Noted: 2024-08-15 | Resolved: 2024-08-26

## 2024-08-26 PROCEDURE — 6370000000 HC RX 637 (ALT 250 FOR IP): Performed by: OBSTETRICS & GYNECOLOGY

## 2024-08-26 PROCEDURE — 0HQ9XZZ REPAIR PERINEUM SKIN, EXTERNAL APPROACH: ICD-10-PCS | Performed by: OBSTETRICS & GYNECOLOGY

## 2024-08-26 PROCEDURE — 1220000000 HC SEMI PRIVATE OB R&B

## 2024-08-26 PROCEDURE — 10907ZC DRAINAGE OF AMNIOTIC FLUID, THERAPEUTIC FROM PRODUCTS OF CONCEPTION, VIA NATURAL OR ARTIFICIAL OPENING: ICD-10-PCS | Performed by: OBSTETRICS & GYNECOLOGY

## 2024-08-26 RX ORDER — ACETAMINOPHEN 650 MG
TABLET, EXTENDED RELEASE ORAL
Status: DISCONTINUED
Start: 2024-08-26 | End: 2024-08-26

## 2024-08-26 RX ORDER — PRENATAL WITH FERROUS FUM AND FOLIC ACID 3080; 920; 120; 400; 22; 1.84; 3; 20; 10; 1; 12; 200; 27; 25; 2 [IU]/1; [IU]/1; MG/1; [IU]/1; MG/1; MG/1; MG/1; MG/1; MG/1; MG/1; UG/1; MG/1; MG/1; MG/1; MG/1
1 TABLET ORAL DAILY
Status: DISCONTINUED | OUTPATIENT
Start: 2024-08-26 | End: 2024-08-27 | Stop reason: HOSPADM

## 2024-08-26 RX ORDER — ONDANSETRON 2 MG/ML
4 INJECTION INTRAMUSCULAR; INTRAVENOUS EVERY 6 HOURS PRN
Status: DISCONTINUED | OUTPATIENT
Start: 2024-08-26 | End: 2024-08-27 | Stop reason: HOSPADM

## 2024-08-26 RX ORDER — IBUPROFEN 600 MG/1
600 TABLET, FILM COATED ORAL EVERY 6 HOURS PRN
Status: DISCONTINUED | OUTPATIENT
Start: 2024-08-26 | End: 2024-08-27 | Stop reason: HOSPADM

## 2024-08-26 RX ORDER — DOCUSATE SODIUM 100 MG/1
100 CAPSULE, LIQUID FILLED ORAL 2 TIMES DAILY
Status: DISCONTINUED | OUTPATIENT
Start: 2024-08-26 | End: 2024-08-27 | Stop reason: HOSPADM

## 2024-08-26 RX ORDER — ACETAMINOPHEN 500 MG
1000 TABLET ORAL EVERY 8 HOURS PRN
Status: DISCONTINUED | OUTPATIENT
Start: 2024-08-26 | End: 2024-08-27 | Stop reason: HOSPADM

## 2024-08-26 RX ORDER — MODIFIED LANOLIN
OINTMENT (GRAM) TOPICAL PRN
Status: DISCONTINUED | OUTPATIENT
Start: 2024-08-26 | End: 2024-08-27 | Stop reason: HOSPADM

## 2024-08-26 RX ORDER — NALOXONE HYDROCHLORIDE 0.4 MG/ML
INJECTION, SOLUTION INTRAMUSCULAR; INTRAVENOUS; SUBCUTANEOUS PRN
Status: DISCONTINUED | OUTPATIENT
Start: 2024-08-26 | End: 2024-08-26

## 2024-08-26 RX ORDER — LIDOCAINE HYDROCHLORIDE 10 MG/ML
INJECTION, SOLUTION INFILTRATION; PERINEURAL
Status: DISCONTINUED
Start: 2024-08-26 | End: 2024-08-26

## 2024-08-26 RX ORDER — ONDANSETRON 2 MG/ML
4 INJECTION INTRAMUSCULAR; INTRAVENOUS EVERY 6 HOURS PRN
Status: DISCONTINUED | OUTPATIENT
Start: 2024-08-26 | End: 2024-08-26

## 2024-08-26 RX ORDER — ONDANSETRON 4 MG/1
4 TABLET, ORALLY DISINTEGRATING ORAL EVERY 6 HOURS PRN
Status: DISCONTINUED | OUTPATIENT
Start: 2024-08-26 | End: 2024-08-27 | Stop reason: HOSPADM

## 2024-08-26 RX ORDER — LEVOTHYROXINE SODIUM 50 UG/1
50 TABLET ORAL
Status: DISCONTINUED | OUTPATIENT
Start: 2024-08-27 | End: 2024-08-27 | Stop reason: HOSPADM

## 2024-08-26 RX ADMIN — IBUPROFEN 600 MG: 600 TABLET, FILM COATED ORAL at 20:25

## 2024-08-26 RX ADMIN — PRENATAL WITH FERROUS FUM AND FOLIC ACID 1 TABLET: 3080; 920; 120; 400; 22; 1.84; 3; 20; 10; 1; 12; 200; 27; 25; 2 TABLET ORAL at 13:02

## 2024-08-26 RX ADMIN — Medication 25 MCG: at 02:06

## 2024-08-26 RX ADMIN — IBUPROFEN 600 MG: 600 TABLET, FILM COATED ORAL at 13:02

## 2024-08-26 RX ADMIN — DOCUSATE SODIUM 100 MG: 100 CAPSULE, LIQUID FILLED ORAL at 13:02

## 2024-08-26 RX ADMIN — DOCUSATE SODIUM 100 MG: 100 CAPSULE, LIQUID FILLED ORAL at 20:25

## 2024-08-26 RX ADMIN — Medication: at 13:02

## 2024-08-26 RX ADMIN — ACETAMINOPHEN 1000 MG: 500 TABLET ORAL at 18:27

## 2024-08-26 ASSESSMENT — PAIN DESCRIPTION - LOCATION
LOCATION: ABDOMEN;PERINEUM
LOCATION: ABDOMEN
LOCATION: ABDOMEN;PERINEUM

## 2024-08-26 ASSESSMENT — PAIN SCALES - GENERAL
PAINLEVEL_OUTOF10: 4
PAINLEVEL_OUTOF10: 1
PAINLEVEL_OUTOF10: 0
PAINLEVEL_OUTOF10: 1
PAINLEVEL_OUTOF10: 0

## 2024-08-26 ASSESSMENT — PAIN - FUNCTIONAL ASSESSMENT
PAIN_FUNCTIONAL_ASSESSMENT: ACTIVITIES ARE NOT PREVENTED

## 2024-08-26 ASSESSMENT — PAIN DESCRIPTION - ORIENTATION
ORIENTATION: LOWER

## 2024-08-26 ASSESSMENT — PAIN DESCRIPTION - DESCRIPTORS
DESCRIPTORS: CRAMPING
DESCRIPTORS: ACHING;CRAMPING;DISCOMFORT
DESCRIPTORS: ACHING;CRAMPING;DISCOMFORT

## 2024-08-26 NOTE — PROGRESS NOTES
Dr. Cedeno called updated on pt sve 7/8-90-1. Explained that night shift RN checked her at 0645 and was 5. Orders received to arom pt at 0830.

## 2024-08-26 NOTE — H&P
Department of Obstetrics and Gynecology  Nurse Practitioner Obstetrics History and Physical        CHIEF COMPLAINT:  IOL    HISTORY OF PRESENT ILLNESS:    The patient is a 29 y.o. female , No LMP recorded. Patient is pregnant.,  at 39w3d. Pt presents to l&d as scheduled IOL for LGA. Pt denies regular ctx, lof, vb or decreased fm.   Gbs neg  OB History          4    Para   3    Term   3            AB        Living   3         SAB        IAB        Ectopic        Molar        Multiple   0    Live Births   3                Estimated Due Date: Estimated Date of Delivery: 24    PRENATAL CARE:  hashimotos, macrosomia,     Complicated by:   Patient Active Problem List   Diagnosis Code    Acquired hypothyroidism E03.9    BMI 45.0-49.9, adult (pregravid BMI 47.45) Z68.42    Hypothyroidism due to Hashimoto's thyroiditis E03.8, E06.3    Hypothyroidism complicating pregnancy in third trimester O99.283, E03.9    Obesity E66.9    High risk multigravida, third trimester O09.43    Obesity affecting pregnancy in third trimester O99.213    History of gestational diabetes in prior pregnancy, currently pregnant O09.299, Z86.32    History of macrosomia in infant in prior pregnancy, currently pregnant (G3 9#2oz) O09.299    Hashimoto's thyroiditis E06.3    Vitamin D deficiency E55.9    Maternal varicella,equivocal O09.899, Z28.39    Short interval between pregnancies affecting pregnancy in third trimester, antepartum (8 mos) O09.893    38 weeks gestation of pregnancy Z3A.38    Encounter for suspected premature rupture of amniotic membranes, with rupture of membranes not found Z03.71    Encounter for induction of labor Z34.90       PAST OB HISTORY  OB History          4    Para   3    Term   3            AB        Living   3         SAB        IAB        Ectopic        Molar        Multiple   0    Live Births   3                Past Medical History:        Diagnosis Date    Abnormal Pap smear of  bilaterally  Heart:  regular rate and rhythm  Abdomen:   soft, non-distended, non-tender, no masses palpated, gravid  Fetal heart rate:  Baseline Heart Rate 135, accelerations:  present, decels:noen  Pelvis:  External Genitalia: no lesions  Cervix: per rn  DILATION:  1 cm  EFFACEMENT:   70%  STATION:  -2  CONSISTENCY:  soft    Contraction frequency:  rare minutes  Membranes:  Intact    /71   Pulse (!) 117   Temp 97.4 °F (36.3 °C) (Oral)   Resp 16   Ht 1.549 m (5' 1\")   Wt 130.6 kg (288 lb)   BMI 54.42 kg/m²                 Prenatal Labs  Blood Type/Rh: O pos  Antibody Screen: negative    Rubella: immune  T. Pallidum, IgG: non-reactive   Hepatitis B Surface Antigen: non-reactive   HIV: non-reactive   Sickle Cell Screen: not available  Gonorrhea: negative  Chlamydia: negative  Group B Strep: negative        ASSESSMENT AND PLAN:  Nurse to D/w Dr. david  Admit  Routine admission orders  Cytotec induction          Electronically signed by TERENCE Mitchell CNP on 8/25/2024 at 10:29 PM

## 2024-08-26 NOTE — PROGRESS NOTES
Vitals and assessment as charted.   Pt admitted to room 306.   Refusal for erythromycin & Hep B signed by mother.   Pt refuses Tdap.   PPD score and [place] videos reviewed and at bedside with a pen.

## 2024-08-26 NOTE — L&D DELIVERY NOTE
Department of Obstetrics and Gynecology  Spontaneous Vaginal Delivery Note    Patient:  Abbey Mclaughlin     Admit Date:  2024  8:19 PM  Medical Record Number:  [id number]   Procedure Date: 2024 10:22 AM     Pre-delivery Diagnosis: High risk multigravida IUP at 39 weeks 4 days, obesity (pregravid BMI 47.45), hypothyroidism due to Hashimoto's thyroiditis, history of macrosomic infant in prior pregnancy (9# 2oz), short interval between pregnancies (8 months), maternal varicella equivocal, vitamin D deficiency  Patient Active Problem List   Diagnosis    Acquired hypothyroidism    BMI 45.0-49.9, adult (pregravid BMI 47.45)    Hypothyroidism due to Hashimoto's thyroiditis    Hypothyroidism complicating pregnancy in third trimester    Obesity    High risk multigravida, third trimester    Obesity affecting pregnancy in third trimester    History of gestational diabetes in prior pregnancy, currently pregnant    History of macrosomia in infant in prior pregnancy, currently pregnant (G3 9#2oz)    Hashimoto's thyroiditis    Vitamin D deficiency    Maternal varicella,equivocal    Short interval between pregnancies affecting pregnancy in third trimester, antepartum (8 mos)    39 4/7 weeks gestation of pregnancy    Encounter for induction of labor     Post-delivery Diagnosis:  Living  infant Female, first-degree laceration  Patient Active Problem List   Diagnosis    Acquired hypothyroidism    BMI 45.0-49.9, adult (pregravid BMI 47.45)    Hypothyroidism due to Hashimoto's thyroiditis    Hypothyroidism complicating pregnancy in third trimester    Obesity    High risk multigravida, third trimester    Obesity affecting pregnancy in third trimester    History of gestational diabetes in prior pregnancy, currently pregnant    History of macrosomia in infant in prior pregnancy, currently pregnant (G3 9#2oz)    Hashimoto's thyroiditis    Vitamin D deficiency    Maternal varicella,equivocal    Short interval between  Labor: No   Steroids: None  Cervical Ripening Date/Time:  24 21:52:00   Cervical Ripening Type: Misoprostol  Antibiotics Received during Labor: No  Rupture Date/Time:  24 08:32:00   Rupture Type: AROM  Fluid Color: Clear  Fluid Odor: None  Induction: Misoprostol  Augmentation: None  Labor Complications: None    Anesthesia    Method: Local    Labor Event Times      Labor onset date/time:  24 05:00:00     Dilation complete date/time:  24 08:32:00     Start pushing date/time:  2024 08:32:00     Labor Length    1st stage: 3h 32m  2nd stage: 0h 01m  3rd stage: 0h 08m    Delivery Details      Delivery Date: 24 Delivery Time: 08:33:00   Delivery Type: Vaginal, Spontaneous     Presentation    Presentation: Vertex  Position: Right  _: Occiput  _: Anterior    Shoulder Dystocia    Shoulder Dystocia Present?: No    Assisted Delivery Details    Forceps Attempted?: No  Vacuum Extractor Attempted?: No    Cord    Vessels: 3 Vessels  Complications: None  Delayed Cord Clamping?: Yes  Cord Clamped Date/Time: 2024 08:36:00  Cord Blood Disposition: Lab  Gases Sent?: Yes    Placenta    Date/Time: 2024 08:41:00  Removal: Spontaneous  Appearance: Intact  Disposition: Placenta Refrigerator    Lacerations    Episiotomy: None  Perineal Lacerations: 1st  Other Lacerations: no non-perineal laceration  Number of Repair Packets: 1    Vaginal Counts    Initial Count Personnel: BRISEIDA  Initial Count Verified By: BAO  Intial Sponge Count: Correct Intial Needles Count: Correct Intial Instruments Count: Correct   Final Sponges Count: Correct  Final Instruments Count: Correct   Final Count Personnel: BAO  Final Count Verified By: BRISEIDA  Accurate Final Count?: Yes    Blood Loss  Mother: Abbey Mclaughlin #[id number]     Start of Mother's Information      Delivery Blood Loss  24 0500 - 24 1441      Quantitative Blood Loss (mL) Hospital Encounter 100 grams    Total  100 mL

## 2024-08-26 NOTE — PROGRESS NOTES
Dr Cedeno called and updated that patient is 4/70/-2, and does not want an epidural and is getting uncomfortable. Also updated that patient is karla every 1-3 minutes. No new orders at this time.

## 2024-08-26 NOTE — PROGRESS NOTES
Spoke with Dr Cedeno regarding patient. Orders for cytotec throughout the night and pain orders received.

## 2024-08-26 NOTE — ANESTHESIA PRE PROCEDURE
PM    CO2 22 05/31/2024 10:23 PM    BUN 5 05/31/2024 10:23 PM    CREATININE 0.5 05/31/2024 10:23 PM    GFRAA >60 05/04/2017 10:10 AM    LABGLOM >90 05/31/2024 10:23 PM    GLUCOSE 95 05/31/2024 10:23 PM    CALCIUM 9.3 05/31/2024 10:23 PM    BILITOT <0.2 05/31/2024 10:23 PM    ALKPHOS 82 05/31/2024 10:23 PM    AST 8 05/31/2024 10:23 PM    ALT <5 05/31/2024 10:23 PM       POC Tests: No results for input(s): \"POCGLU\", \"POCNA\", \"POCK\", \"POCCL\", \"POCBUN\", \"POCHEMO\", \"POCHCT\" in the last 72 hours.    Coags: No results found for: \"PROTIME\", \"INR\", \"APTT\"    HCG (If Applicable):   Lab Results   Component Value Date    PREGTESTUR negative 05/20/2022    HCGQUANT <0.1 06/19/2014        ABGs: No results found for: \"PHART\", \"PO2ART\", \"YFN3QYV\", \"CJX4ADA\", \"BEART\", \"D9XBFFEV\"     Type & Screen (If Applicable):  No results found for: \"LABABO\"    Drug/Infectious Status (If Applicable):  Lab Results   Component Value Date/Time    HEPCAB NONREACTIVE 02/05/2024 05:07 PM       COVID-19 Screening (If Applicable): No results found for: \"COVID19\"        Anesthesia Evaluation  Patient summary reviewed and Nursing notes reviewed  Airway: Mallampati: II  TM distance: >3 FB   Neck ROM: full  Mouth opening: > = 3 FB   Dental: normal exam     Comment: FRANCY. In active pain and having ctx.    Pulmonary:Negative Pulmonary ROS and normal exam  breath sounds clear to auscultation                            PE comment: FRANCY. In active pain and having ctx. Cardiovascular:  Exercise tolerance: poor (<4 METS)          Rhythm: regular  Rate: normal           Beta Blocker:  Not on Beta Blocker        PE comment: FRANCY. In active pain and having ctx.   Neuro/Psych:   (+) psychiatric history:            GI/Hepatic/Renal: Neg GI/Hepatic/Renal ROS            Endo/Other:    (+) Diabetes, hypothyroidism::..                 Abdominal:             Vascular: negative vascular ROS.         Other Findings:       Anesthesia Plan      epidural, general and spinal     ASA 2        Induction: intravenous.      Anesthetic plan and risks discussed with spouse and patient.    Use of blood products discussed with patient and spouse whom.    Plan discussed with CRNA and attending.                CASSIE AGOSTO RN   8/26/2024    Chart reviewed and patient assessed. Agreed with above note. Cuauhtem[place] PRATT CRNA

## 2024-08-26 NOTE — LACTATION NOTE
Experienced mom reports baby nursed well initially. Encouraged skin to skin and frequent attempts at breast to stimulate milk production. Instructed on normal infant behavior in the first 12-24 hours and importance of stimulating the baby frequently to eat during this time. Reviewed hand expression, and encouraged to hand express drops of colostrum when baby is sleepy. Instructed that baby may also feed 8-12 times a day- cluster feeding at times- as her milk supply is being established.  Instructed on benefits of skin to skin and avoidance of pacifier / artificial nipple use until breastfeeding is well established.  Educated on making sure infant has an open airway while breastfeeding and skin to skin. Instructed on hunger cues and waking techniques to try. Reviewed signs of adequate I & O; allow baby to feed ad lo and not to limit time at breast. Breastfeeding booklet provided with review of its contents. Encouraged to call with any concerns. Hand pump given for home use, which she prefers.

## 2024-08-26 NOTE — PROGRESS NOTES
RN remains at bedside, pt states she is pushing and feels a lot of pressure, states she feels her water broke or she might have peed,RN notices fluid leaking. RN checks pt and pt is 8cm with water still intact.

## 2024-08-26 NOTE — PROGRESS NOTES
Called by nursing to be on stand by for delivery. Pt pushing involuntarily. SVE ant lip w/ bulging forebag. Forebag ruptured artificially. Delivered viable female infant in 1-2 pushes. Dr. Cedeno arrived after delivery of the infant and assumed care of patient.

## 2024-08-26 NOTE — PROGRESS NOTES
@ 39 weeks and 3 days. Her for scheduled induction. Patient denies leaking of fluid, vaginal bleeding. +FM

## 2024-08-27 VITALS
HEART RATE: 91 BPM | TEMPERATURE: 98.3 F | RESPIRATION RATE: 16 BRPM | WEIGHT: 288 LBS | OXYGEN SATURATION: 98 % | BODY MASS INDEX: 54.37 KG/M2 | SYSTOLIC BLOOD PRESSURE: 106 MMHG | HEIGHT: 61 IN | DIASTOLIC BLOOD PRESSURE: 67 MMHG

## 2024-08-27 PROBLEM — E66.9 OBESITY: Status: RESOLVED | Noted: 2023-03-25 | Resolved: 2024-08-27

## 2024-08-27 PROBLEM — O09.893 SHORT INTERVAL BETWEEN PREGNANCIES AFFECTING PREGNANCY IN THIRD TRIMESTER, ANTEPARTUM: Status: RESOLVED | Noted: 2024-08-12 | Resolved: 2024-08-27

## 2024-08-27 PROBLEM — E55.9 VITAMIN D DEFICIENCY: Status: RESOLVED | Noted: 2024-08-08 | Resolved: 2024-08-27

## 2024-08-27 PROBLEM — O09.299 HISTORY OF GESTATIONAL DIABETES IN PRIOR PREGNANCY, CURRENTLY PREGNANT: Status: RESOLVED | Noted: 2024-08-08 | Resolved: 2024-08-27

## 2024-08-27 PROBLEM — O99.213 OBESITY AFFECTING PREGNANCY IN THIRD TRIMESTER: Status: RESOLVED | Noted: 2024-08-08 | Resolved: 2024-08-27

## 2024-08-27 PROBLEM — Z86.32 HISTORY OF GESTATIONAL DIABETES IN PRIOR PREGNANCY, CURRENTLY PREGNANT: Status: RESOLVED | Noted: 2024-08-08 | Resolved: 2024-08-27

## 2024-08-27 PROBLEM — Z3A.39 39 WEEKS GESTATION OF PREGNANCY: Status: RESOLVED | Noted: 2024-08-15 | Resolved: 2024-08-27

## 2024-08-27 PROBLEM — Z34.90 ENCOUNTER FOR INDUCTION OF LABOR: Status: RESOLVED | Noted: 2024-08-25 | Resolved: 2024-08-27

## 2024-08-27 PROCEDURE — 6370000000 HC RX 637 (ALT 250 FOR IP): Performed by: OBSTETRICS & GYNECOLOGY

## 2024-08-27 RX ORDER — LEVOTHYROXINE SODIUM 50 UG/1
50 TABLET ORAL
Qty: 30 TABLET | Refills: 3 | Status: SHIPPED | OUTPATIENT
Start: 2024-08-28

## 2024-08-27 RX ORDER — IBUPROFEN 600 MG/1
600 TABLET, FILM COATED ORAL EVERY 6 HOURS PRN
Qty: 120 TABLET | Refills: 3 | Status: SHIPPED | OUTPATIENT
Start: 2024-08-27 | End: 2024-08-31

## 2024-08-27 RX ORDER — PSEUDOEPHEDRINE HCL 30 MG
100 TABLET ORAL 2 TIMES DAILY PRN
Qty: 60 CAPSULE | Refills: 1 | Status: SHIPPED | OUTPATIENT
Start: 2024-08-27

## 2024-08-27 RX ADMIN — LEVOTHYROXINE SODIUM 50 MCG: 0.05 TABLET ORAL at 06:44

## 2024-08-27 RX ADMIN — IBUPROFEN 600 MG: 600 TABLET, FILM COATED ORAL at 02:50

## 2024-08-27 RX ADMIN — ACETAMINOPHEN 1000 MG: 500 TABLET ORAL at 09:07

## 2024-08-27 RX ADMIN — DOCUSATE SODIUM 100 MG: 100 CAPSULE, LIQUID FILLED ORAL at 09:08

## 2024-08-27 ASSESSMENT — PAIN DESCRIPTION - ORIENTATION: ORIENTATION: LOWER

## 2024-08-27 ASSESSMENT — PAIN DESCRIPTION - DESCRIPTORS
DESCRIPTORS: DISCOMFORT;CRAMPING
DESCRIPTORS: CRAMPING;SORE

## 2024-08-27 ASSESSMENT — PAIN SCALES - GENERAL
PAINLEVEL_OUTOF10: 1
PAINLEVEL_OUTOF10: 4
PAINLEVEL_OUTOF10: 3

## 2024-08-27 ASSESSMENT — PAIN DESCRIPTION - LOCATION
LOCATION: ABDOMEN
LOCATION: ABDOMEN

## 2024-08-27 NOTE — DISCHARGE INSTRUCTIONS
Follow up with your OB doctor in  1 week for a  delivery or in 6 weeks for a vaginal delivery unless otherwise instructed.  Call office for appointment..  For breastfeeding support, you can contact our lactation specialists at [phone] or   [phone].                                                                  DIET  Eat a well balanced diet focusing on foods high in fiber and protein  Drink plenty of fluids especially water.  To avoid constipation you may take a mild stool softener as recommended by your doctor or midwife.    ACTIVITY  Gradually increase your activity.  Resume exercise regimen only after advised by your doctor or midwife.  Avoid lifting anything heavier than your baby or a gallon of milk until OK'd by your physician or midlife.   Avoid driving until your doctor or midwife has given their approval.  Rise slowly from a lying to sitting and then a standing position.  Climb stairs one at a time.  Use caution when carrying your baby up and down the stairs.  No sexual activity for 6 weeks or until advised by your doctor - Nothing in vagina: intercourse, tampons, or douching.   Be prepared to discuss family planning at your follow-up OB visit.   You may feel tired or have a lack of energy.  You may continue your prenatal vitamin to replenish nutrients post delivery.  Nap when infant naps to catch up on sleep.  May return to work or school in 6 weeks or as directed by physician or midlife.   Take pain medication as prescribed whenever you need them  Take care of yourself by sleeping/resting as much as possible.  Let someone else care for you, your infant and housework as much as possible for a while.    EMOTIONS  You may feed vance, sad, teary, & overwhelmed.    If infant will not stop crying, contact another adult for help or place infant in their crib on their back and take a break.  NEVER shake your infant.    Call your doctor if you have unrelieved feelings of: inability to cope,  touch.  You are passing blood clots bigger than the size of a lemon.  If you are experiencing extreme weakness or dizziness.   If you are having flu-like symptoms such as achy muscles or joints.  If you have pain that cannot be relieved.  You have persistent burning with urination or frequency.   You have swelling, bleeding, drainage, foul odor, redness, or warmth in/around your incision or stitches.  You have a red, warm, tender area in you calf.  Call if you have concerns about your well-being or if you feel you may be showing signs of post partum depression, or have thoughts of harming yourself or infant.  Increased pain at the site of the episiotomy.  Difficulty urinating.  Difficulty breathing with or without chest pain.  Headache not relieved by pain meds.   If you are saturating more than one maxi pad in an hour, foul smelling vaginal discharge, sudden continuing increased vaginal bleeding with or without clots.  If you develop a warm, red, tender area on your breast, have nipple discharge which is foul smelling or contains pus, or develop a fever.                               NEVER SHAKE A BABY PROMISE.  Shaking can kill a baby.  It can also cause seizures, brain damage, learning problems,  Cerebral palsy, blindness and other serious health and developmental problems.    I (have seen) (am aware of) the video about shaking a baby and understand that shaking a baby is a serious form of child abuse.                                                        I PROMISE NEVER TO SHAKE MY BABY    I understand that caregivers other than the mother often shakes babies. I also promise to discuss the dangers of shaking a baby with everyone who takes care of my baby.  I promise to tell anyone who care for my baby to never, never shake my baby.

## 2024-08-27 NOTE — DISCHARGE SUMMARY
Department of Obstetrics and Gynecology  Labor and Delivery  Discharge Summary    Patient:  Abbey Mclaughlin         Medical Record Number:  [id number]    Admit Date:  2024  8:19 PM    Discharge Date: 2024    Final Diagnosis:   Principal Problem (Resolved):    Encounter for induction of labor  Active Problems:    Hypothyroidism complicating pregnancy in third trimester    High risk multigravida, third trimester     (normal spontaneous vaginal delivery)    Term birth of  female    First degree laceration of perineum, delivered, current hospitalization    Hypothyroidism due to Hashimoto's thyroiditis    History of macrosomia in infant in prior pregnancy, currently pregnant (G3 9#2oz)    Hashimoto's thyroiditis    Maternal varicella,equivocal  Resolved Problems:    BMI 45.0-49.9, adult (pregravid BMI 47.45)    Acquired hypothyroidism    Obesity    Obesity affecting pregnancy in third trimester    History of gestational diabetes in prior pregnancy, currently pregnant    Vitamin D deficiency    Short interval between pregnancies affecting pregnancy in third trimester, antepartum (8 mos)    39 4/7 weeks gestation of pregnancy      Chief Complaint - Presenting Illness - Physical Findings:   Encounter for induction of labor [Z34.90]  HPI: came in for IOL High risk multigravida IUP at 39 weeks 4 days, obesity (pregravid BMI 47.45), hypothyroidism due to Hashimoto's thyroiditis, history of macrosomic infant in prior pregnancy (9# 2oz), short interval between pregnancies (8 months), maternal varicella equivocal, vitamin D deficiency     Hospital Course:   Delivery Summary:  Damir Mclaughlin [[id number]]      Labor Events     Labor: No   Steroids: None  Cervical Ripening Date/Time:  24 21:52:00   Cervical Ripening Type: Misoprostol  Antibiotics Received during Labor: No  Rupture Date/Time:  24 08:32:00   Rupture Type: AROM  Fluid Color: Clear  Fluid Odor: None  Induction:

## 2024-08-27 NOTE — DISCHARGE INSTR - ACTIVITY
After Your Delivery Discharge Instructions    What to do after you leave the hospital:    Recommended diet: regular diet  Recommended activity: No driving for 1 weeks, no sex or tampon use for 6 weeks. No douching.  No heavy lifting (greater than baby and carrier) for 6 weeks.  Initially, avoid frequent ambulation with stairs - start slowly then increase as tolerated.  You may return to work in 6 weeks.  Showers are fine - avoid bath tubs, hot tubs, swimming.  Follow-up in 6 weeks for final postpartum visit.    Call the Physician with any of these signs and symptoms:    Warning signs regarding stitches:  \"Popping\" of stitches  Foul smelling discharge or pus  More redness or streaks around stitches than before    Perineum / episiotomy care:  Continue care as in the hospital (sitz baths, squirt bottle, etc.)  No tub baths until OK'd by your Physician , showers are fine     After your delivery - signs and symptoms to watch for:  Fever - Oral temperature greater than 100.4 degrees Fahrenheit  Foul-smelling vaginal discharge  Headache unrelieved by \"pain meds\"  Difficulty urinating  Breasts reddened, hard, hot to the touch  Nipple discharge which is foul-smelling or contains pus  Increased pain at the site of the laceration  Difficulty breathing with or without chest pain  New calf pain especially if only on one side  Sudden, continuing increased vaginal bleeding (heavier than a period) with or without clots  Unrelieved feelings of:  Inability to cope  Sadness  Anxiety  Lack of interest in baby  Insomnia  Crying     What to do at home:  Resume activity gradually   Don't lift anything heavier than baby and carrier until OK'd by your Physician   No sex until OK'd by your Physician  Eat regular nutritious meals  Take pain medication as prescribed whenever you need them  To avoid/relieve constipation take stool softeners if advised   Increase fiber in your diet    Most importantly ENJOY your Baby!  - Dr. MONIQUE =)))    Please  call immediately with Questions and Concerns - [phone] (Office) or [phone] (Answering Service) after hours and weekends.     By signing below, I understand that if any emergent problems occur, once I leave the hospital, I am to dial #[phone] or go immediately to the nearest Emergency Room.  For less emergent matters,  Dr. Cedeno can be reached by calling his Office or  answering service at the above numbers.  I understand and acknowledge receipt of the instructions indicated above.

## 2024-08-27 NOTE — PROGRESS NOTES
Hearing screening results were discussed with parent. Questions answered. Brochure given to parent. Advised to monitor developmental milestones and contact physician for any concerns.   Electronically signed by Jon Raygoza on 8/27/2024 at 8:42 AM

## 2024-08-27 NOTE — PROGRESS NOTES
PPD #1    Patient:  Abbey Mclaughlin     Admit Date:  8/25/2024  8:19 PM  Medical Record Number:  [id number]   Today's Date: 8/27/2024    S: No complaints; tolerating diet: affirms ; ambulating well: affirms; voiding without difficulty:  affirms; bm: denies; flatus: affirms; pain meds appropriate: affirms; vaginal bleeding: thin lochia.    O:   Vitals:    08/26/24 1503 08/26/24 1831 08/27/24 0203 08/27/24 0709   BP: 123/62 (!) 143/71 (!) 114/56 106/67   Pulse: 86 90 94 91   Resp: 16 18 16 16   Temp: 98.2 °F (36.8 °C) 98.4 °F (36.9 °C) 98.5 °F (36.9 °C) 98.3 °F (36.8 °C)   TempSrc: Oral Oral Oral Oral   SpO2: 98% 98%  98%   Weight:       Height:         Gen: A&O, cooperative, pleasant   Heart: RRR   Lungs: CTA b/l   Abd; soft, NT, non distended, +BS  Back: no CVA or paraspinal tenderness   Ext: No clubbing, cyanosis, edema:1+ , no cords palpable, no calf tenderness   Neuro: intact   Uterus: firm, well contracted, nt   Perineum: intact, healing well   Maggie pad: staining only    Lab Results   Component Value Date    HGB 12.1 08/25/2024    HCT 37.0 08/25/2024      Recent Results (from the past 72 hour(s))   Drug Screen Multi Urine With Bup    Collection Time: 08/25/24  8:40 PM   Result Value Ref Range    Amphetamine Screen, Ur NEGATIVE NEGATIVE    Barbiturate Screen, Ur NEGATIVE NEGATIVE    Cocaine Metabolite, Urine NEGATIVE NEGATIVE    Benzodiazepine Screen, Urine NEGATIVE NEGATIVE    Buprenorphine Urine NEGATIVE NEGATIVE    Methadone Screen, Urine NEGATIVE NEGATIVE    Opiates, Urine NEGATIVE NEGATIVE    Phencyclidine, Urine NEGATIVE NEGATIVE    Cannabinoid Scrn, Ur NEGATIVE NEGATIVE    Fentanyl, Ur NEGATIVE NEGATIVE    Oxycodone Screen, Ur NEGATIVE NEGATIVE    Test Information       These drug screen results are for medical purposes only and should not be considered definitive or confirmed.   CBC    Collection Time: 08/25/24  8:40 PM   Result Value Ref Range    WBC 11.0 4.5 - 11.5 k/uL    RBC 4.26 3.50 - 5.50 m/uL

## 2024-11-04 ENCOUNTER — TELEPHONE (OUTPATIENT)
Dept: ENDOCRINOLOGY | Age: 29
End: 2024-11-04

## 2024-11-04 DIAGNOSIS — R94.6 ABNORMAL THYROID FUNCTION TEST: Primary | ICD-10-CM

## 2024-11-04 NOTE — TELEPHONE ENCOUNTER
Hello! So I’m unsure what to do, but I’m having some abnormal symptoms after the birth of my daughter. I’m about 10 weeks PP and some of these symptoms started as little as 5 weeks ago. Severe stiffness and inflammation of the joints, mainly my knees and ankles.. dizziness, an uneasy jittery feeling, fatigue and just an overall unwell feeling. This is my 4th baby and these are not just regular symptoms I have after I give birth. I believe they’re directly related to my medication. Let me know what the next step should be. Thank you

## 2024-11-06 NOTE — TELEPHONE ENCOUNTER
I want her to repeat her thyroid level again because the last blood work showed a high normal thyroid hormones.  Some female experience postpartum thyroiditis which might explain some of her symptoms but we will still need to see high thyroid hormones.

## 2025-01-17 DIAGNOSIS — R94.6 ABNORMAL THYROID FUNCTION TEST: ICD-10-CM

## 2025-01-17 LAB
T3 TOTAL: 112 NG/DL (ref 80–200)
T4 FREE: 0.8 NG/DL (ref 0.9–1.7)
THYROXINE (T4): 5 UG/DL (ref 4.5–11.7)
TSH SERPL DL<=0.05 MIU/L-ACNC: 15.93 UIU/ML (ref 0.27–4.2)

## 2025-01-19 LAB — THYROID STIMULATING IMMUNOGLOB: 0.17 IU/L

## 2025-01-20 LAB
THYROGLOBULIN AB: 74 IU/ML (ref 0–40)
THYROID PEROXIDASE (TPO) AB: 262 IU/ML (ref 0–25)

## 2025-01-22 ENCOUNTER — TELEPHONE (OUTPATIENT)
Dept: ENDOCRINOLOGY | Age: 30
End: 2025-01-22

## 2025-01-23 NOTE — TELEPHONE ENCOUNTER
Notify patient  Your thyroid hormones are low.  Thyroid antibodies are positive for Hashimoto's.  Please confirm she is currently on Synthroid 50 mcg daily.  Hashimoto's antibodies will continue making her thyroid weak and you will likely need a higher Synthroid dose over the time.  If she is currently taking 50 mcg daily, I do recommend change the dose to 75 mcg daily for tila Synthroid and recheck TSH and free T4 in 6 to 8 weeks.  Please make sure you take your thyroid medication on an empty stomach, 1 hour before breakfast, and at least 4 to 5 hours away from calcium/iron/prenatals/multivitamins containing calcium or iron

## 2025-01-24 DIAGNOSIS — E03.9 ACQUIRED HYPOTHYROIDISM: Primary | ICD-10-CM

## 2025-01-24 DIAGNOSIS — E03.9 ACQUIRED HYPOTHYROIDISM: ICD-10-CM

## 2025-01-24 DIAGNOSIS — R94.6 ABNORMAL THYROID FUNCTION TEST: Primary | ICD-10-CM

## 2025-01-24 RX ORDER — LEVOTHYROXINE SODIUM 75 MCG
TABLET ORAL
Qty: 30 TABLET | Refills: 3 | Status: SHIPPED | OUTPATIENT
Start: 2025-01-24

## 2025-05-12 ENCOUNTER — RESULTS FOLLOW-UP (OUTPATIENT)
Dept: ENDOCRINOLOGY | Age: 30
End: 2025-05-12

## 2025-05-12 DIAGNOSIS — E03.9 ACQUIRED HYPOTHYROIDISM: ICD-10-CM

## 2025-05-12 LAB
T4 FREE: 1 NG/DL (ref 0.9–1.7)
TSH SERPL DL<=0.05 MIU/L-ACNC: 3.85 UIU/ML (ref 0.27–4.2)

## 2025-05-12 NOTE — TELEPHONE ENCOUNTER
Notify patient  Excellent thyroid hormones are much better now.  Continue current thyroid regimen.

## 2025-06-06 ENCOUNTER — PATIENT MESSAGE (OUTPATIENT)
Dept: ENDOCRINOLOGY | Age: 30
End: 2025-06-06

## 2025-06-06 DIAGNOSIS — R94.6 ABNORMAL THYROID FUNCTION TEST: ICD-10-CM

## 2025-06-06 DIAGNOSIS — E03.9 ACQUIRED HYPOTHYROIDISM: ICD-10-CM

## 2025-06-06 RX ORDER — LEVOTHYROXINE SODIUM 75 MCG
TABLET ORAL
Qty: 30 TABLET | Refills: 3 | Status: CANCELLED | OUTPATIENT
Start: 2025-06-06

## 2025-06-06 RX ORDER — LEVOTHYROXINE SODIUM 75 MCG
TABLET ORAL
Qty: 90 TABLET | Refills: 1 | Status: SHIPPED | OUTPATIENT
Start: 2025-06-06